# Patient Record
Sex: FEMALE | Race: WHITE | NOT HISPANIC OR LATINO
[De-identification: names, ages, dates, MRNs, and addresses within clinical notes are randomized per-mention and may not be internally consistent; named-entity substitution may affect disease eponyms.]

---

## 2017-03-13 ENCOUNTER — APPOINTMENT (OUTPATIENT)
Dept: INTERNAL MEDICINE | Facility: CLINIC | Age: 72
End: 2017-03-13

## 2017-03-13 VITALS
HEIGHT: 64.5 IN | BODY MASS INDEX: 20.74 KG/M2 | TEMPERATURE: 97.8 F | RESPIRATION RATE: 14 BRPM | WEIGHT: 123 LBS | SYSTOLIC BLOOD PRESSURE: 105 MMHG | OXYGEN SATURATION: 96 % | HEART RATE: 72 BPM | DIASTOLIC BLOOD PRESSURE: 70 MMHG

## 2017-03-14 LAB
25(OH)D3 SERPL-MCNC: 33.6 NG/ML
ALBUMIN SERPL ELPH-MCNC: 4.3 G/DL
ALP BLD-CCNC: 55 U/L
ALT SERPL-CCNC: 15 U/L
ANION GAP SERPL CALC-SCNC: 16 MMOL/L
APPEARANCE: CLEAR
AST SERPL-CCNC: 21 U/L
BASOPHILS # BLD AUTO: 0.02 K/UL
BASOPHILS NFR BLD AUTO: 0.4 %
BILIRUB SERPL-MCNC: 0.4 MG/DL
BILIRUBIN URINE: NEGATIVE
BLOOD URINE: NEGATIVE
BUN SERPL-MCNC: 23 MG/DL
CALCIUM SERPL-MCNC: 10.2 MG/DL
CHLORIDE SERPL-SCNC: 102 MMOL/L
CHOLEST SERPL-MCNC: 225 MG/DL
CHOLEST/HDLC SERPL: 3.2 RATIO
CO2 SERPL-SCNC: 22 MMOL/L
COLOR: YELLOW
CREAT SERPL-MCNC: 0.89 MG/DL
EOSINOPHIL # BLD AUTO: 0.06 K/UL
EOSINOPHIL NFR BLD AUTO: 1.3 %
GLUCOSE QUALITATIVE U: NORMAL MG/DL
GLUCOSE SERPL-MCNC: 91 MG/DL
HBA1C MFR BLD HPLC: 5.7 %
HCT VFR BLD CALC: 38.6 %
HDLC SERPL-MCNC: 70 MG/DL
HGB BLD-MCNC: 13 G/DL
IMM GRANULOCYTES NFR BLD AUTO: 0.2 %
KETONES URINE: NEGATIVE
LDLC SERPL CALC-MCNC: 137 MG/DL
LEUKOCYTE ESTERASE URINE: NEGATIVE
LYMPHOCYTES # BLD AUTO: 1.5 K/UL
LYMPHOCYTES NFR BLD AUTO: 31.6 %
MAN DIFF?: NORMAL
MCHC RBC-ENTMCNC: 33.7 GM/DL
MCHC RBC-ENTMCNC: 34.8 PG
MCV RBC AUTO: 103.2 FL
MONOCYTES # BLD AUTO: 0.48 K/UL
MONOCYTES NFR BLD AUTO: 10.1 %
NEUTROPHILS # BLD AUTO: 2.68 K/UL
NEUTROPHILS NFR BLD AUTO: 56.4 %
NITRITE URINE: NEGATIVE
PH URINE: 6
PLATELET # BLD AUTO: 217 K/UL
POTASSIUM SERPL-SCNC: 5.3 MMOL/L
PROT SERPL-MCNC: 7.3 G/DL
PROTEIN URINE: NEGATIVE MG/DL
RBC # BLD: 3.74 M/UL
RBC # FLD: 14.3 %
SODIUM SERPL-SCNC: 140 MMOL/L
SPECIFIC GRAVITY URINE: 1.01
TRIGL SERPL-MCNC: 88 MG/DL
UROBILINOGEN URINE: NORMAL MG/DL
WBC # FLD AUTO: 4.75 K/UL

## 2017-09-13 ENCOUNTER — APPOINTMENT (OUTPATIENT)
Dept: INTERNAL MEDICINE | Facility: CLINIC | Age: 72
End: 2017-09-13
Payer: MEDICARE

## 2017-09-13 VITALS
DIASTOLIC BLOOD PRESSURE: 70 MMHG | HEIGHT: 64.5 IN | TEMPERATURE: 98.7 F | OXYGEN SATURATION: 98 % | BODY MASS INDEX: 21.08 KG/M2 | SYSTOLIC BLOOD PRESSURE: 104 MMHG | RESPIRATION RATE: 12 BRPM | HEART RATE: 73 BPM | WEIGHT: 125 LBS

## 2017-09-13 DIAGNOSIS — R53.81 OTHER MALAISE: ICD-10-CM

## 2017-09-13 DIAGNOSIS — R53.83 OTHER MALAISE: ICD-10-CM

## 2017-09-13 PROCEDURE — 99214 OFFICE O/P EST MOD 30 MIN: CPT

## 2017-10-04 ENCOUNTER — MESSAGE (OUTPATIENT)
Age: 72
End: 2017-10-04

## 2017-10-26 ENCOUNTER — RX RENEWAL (OUTPATIENT)
Age: 72
End: 2017-10-26

## 2018-01-10 ENCOUNTER — RX RENEWAL (OUTPATIENT)
Age: 73
End: 2018-01-10

## 2018-03-05 ENCOUNTER — APPOINTMENT (OUTPATIENT)
Dept: INTERNAL MEDICINE | Facility: CLINIC | Age: 73
End: 2018-03-05
Payer: MEDICARE

## 2018-03-05 ENCOUNTER — LABORATORY RESULT (OUTPATIENT)
Age: 73
End: 2018-03-05

## 2018-03-05 VITALS
HEIGHT: 64 IN | BODY MASS INDEX: 21.34 KG/M2 | WEIGHT: 125 LBS | OXYGEN SATURATION: 95 % | SYSTOLIC BLOOD PRESSURE: 100 MMHG | DIASTOLIC BLOOD PRESSURE: 70 MMHG | HEART RATE: 64 BPM

## 2018-03-05 PROCEDURE — 36415 COLL VENOUS BLD VENIPUNCTURE: CPT

## 2018-03-05 PROCEDURE — 93000 ELECTROCARDIOGRAM COMPLETE: CPT

## 2018-03-05 PROCEDURE — G0439: CPT

## 2018-03-06 ENCOUNTER — MESSAGE (OUTPATIENT)
Age: 73
End: 2018-03-06

## 2018-03-06 LAB
ALBUMIN SERPL ELPH-MCNC: 4.5 G/DL
ALP BLD-CCNC: 57 U/L
ALT SERPL-CCNC: 14 U/L
ANION GAP SERPL CALC-SCNC: 15 MMOL/L
APPEARANCE: CLEAR
AST SERPL-CCNC: 23 U/L
BILIRUB SERPL-MCNC: 0.6 MG/DL
BILIRUBIN URINE: NEGATIVE
BLOOD URINE: NEGATIVE
BUN SERPL-MCNC: 13 MG/DL
CALCIUM SERPL-MCNC: 9.7 MG/DL
CHLORIDE SERPL-SCNC: 100 MMOL/L
CHOLEST SERPL-MCNC: 265 MG/DL
CHOLEST/HDLC SERPL: 3.2 RATIO
CO2 SERPL-SCNC: 27 MMOL/L
COLOR: YELLOW
CREAT SERPL-MCNC: 0.79 MG/DL
GLUCOSE QUALITATIVE U: NEGATIVE MG/DL
GLUCOSE SERPL-MCNC: 88 MG/DL
HBA1C MFR BLD HPLC: 5.6 %
HDLC SERPL-MCNC: 84 MG/DL
KETONES URINE: NEGATIVE
LDLC SERPL CALC-MCNC: 160 MG/DL
LEUKOCYTE ESTERASE URINE: NEGATIVE
NITRITE URINE: NEGATIVE
PH URINE: 7.5
POTASSIUM SERPL-SCNC: 4.6 MMOL/L
PROT SERPL-MCNC: 7.3 G/DL
PROTEIN URINE: NEGATIVE MG/DL
SODIUM SERPL-SCNC: 142 MMOL/L
SPECIFIC GRAVITY URINE: 1.01
TRIGL SERPL-MCNC: 105 MG/DL
TSH SERPL-ACNC: 0.86 UIU/ML
UROBILINOGEN URINE: NEGATIVE MG/DL
VIT B12 SERPL-MCNC: 959 PG/ML

## 2018-03-07 LAB
BASOPHILS # BLD AUTO: 0.01 K/UL
BASOPHILS NFR BLD AUTO: 0.3 %
EOSINOPHIL # BLD AUTO: 0.07 K/UL
EOSINOPHIL NFR BLD AUTO: 2.2 %
HCT VFR BLD CALC: 39.2 %
HGB BLD-MCNC: 12.8 G/DL
IMM GRANULOCYTES NFR BLD AUTO: 0 %
LYMPHOCYTES # BLD AUTO: 1.1 K/UL
LYMPHOCYTES NFR BLD AUTO: 34.6 %
MAN DIFF?: NORMAL
MCHC RBC-ENTMCNC: 32.7 GM/DL
MCHC RBC-ENTMCNC: 34.7 PG
MCV RBC AUTO: 106.2 FL
MONOCYTES # BLD AUTO: 0.25 K/UL
MONOCYTES NFR BLD AUTO: 7.9 %
NEUTROPHILS # BLD AUTO: 1.75 K/UL
NEUTROPHILS NFR BLD AUTO: 55 %
PLATELET # BLD AUTO: 195 K/UL
RBC # BLD: 3.69 M/UL
RBC # FLD: 15.4 %
WBC # FLD AUTO: 3.18 K/UL

## 2018-03-11 ENCOUNTER — RX RENEWAL (OUTPATIENT)
Age: 73
End: 2018-03-11

## 2018-04-29 ENCOUNTER — EMERGENCY (EMERGENCY)
Facility: HOSPITAL | Age: 73
LOS: 1 days | Discharge: HOME | End: 2018-04-29
Attending: EMERGENCY MEDICINE | Admitting: EMERGENCY MEDICINE
Payer: MEDICARE

## 2018-04-29 VITALS
TEMPERATURE: 97 F | SYSTOLIC BLOOD PRESSURE: 102 MMHG | RESPIRATION RATE: 19 BRPM | DIASTOLIC BLOOD PRESSURE: 68 MMHG | OXYGEN SATURATION: 97 % | HEART RATE: 58 BPM | HEIGHT: 65 IN | WEIGHT: 128.97 LBS

## 2018-04-29 DIAGNOSIS — K86.2 CYST OF PANCREAS: ICD-10-CM

## 2018-04-29 DIAGNOSIS — R10.13 EPIGASTRIC PAIN: ICD-10-CM

## 2018-04-29 DIAGNOSIS — Z98.891 HISTORY OF UTERINE SCAR FROM PREVIOUS SURGERY: Chronic | ICD-10-CM

## 2018-04-29 LAB
ALBUMIN SERPL ELPH-MCNC: 3.6 G/DL — SIGNIFICANT CHANGE UP (ref 3.4–5)
ALP SERPL-CCNC: 70 U/L — SIGNIFICANT CHANGE UP (ref 40–120)
ALT FLD-CCNC: 22 U/L — SIGNIFICANT CHANGE UP (ref 12–42)
ANION GAP SERPL CALC-SCNC: 8 MMOL/L — LOW (ref 9–16)
APPEARANCE UR: CLEAR — SIGNIFICANT CHANGE UP
AST SERPL-CCNC: 24 U/L — SIGNIFICANT CHANGE UP (ref 15–37)
BASOPHILS NFR BLD AUTO: 0.8 % — SIGNIFICANT CHANGE UP (ref 0–2)
BILIRUB SERPL-MCNC: 0.4 MG/DL — SIGNIFICANT CHANGE UP (ref 0.2–1.2)
BILIRUB UR-MCNC: NEGATIVE — SIGNIFICANT CHANGE UP
BUN SERPL-MCNC: 23 MG/DL — SIGNIFICANT CHANGE UP (ref 7–23)
CALCIUM SERPL-MCNC: 9.2 MG/DL — SIGNIFICANT CHANGE UP (ref 8.5–10.5)
CHLORIDE SERPL-SCNC: 104 MMOL/L — SIGNIFICANT CHANGE UP (ref 96–108)
CO2 SERPL-SCNC: 29 MMOL/L — SIGNIFICANT CHANGE UP (ref 22–31)
COLOR SPEC: YELLOW — SIGNIFICANT CHANGE UP
CREAT SERPL-MCNC: 0.82 MG/DL — SIGNIFICANT CHANGE UP (ref 0.5–1.3)
DIFF PNL FLD: NEGATIVE — SIGNIFICANT CHANGE UP
EOSINOPHIL NFR BLD AUTO: 4.9 % — SIGNIFICANT CHANGE UP (ref 0–6)
GLUCOSE SERPL-MCNC: 104 MG/DL — HIGH (ref 70–99)
GLUCOSE UR QL: NEGATIVE — SIGNIFICANT CHANGE UP
HCT VFR BLD CALC: 35.3 % — SIGNIFICANT CHANGE UP (ref 34.5–45)
HGB BLD-MCNC: 12.2 G/DL — SIGNIFICANT CHANGE UP (ref 11.5–15.5)
IMM GRANULOCYTES NFR BLD AUTO: 0.2 % — SIGNIFICANT CHANGE UP (ref 0–1.5)
KETONES UR-MCNC: NEGATIVE — SIGNIFICANT CHANGE UP
LEUKOCYTE ESTERASE UR-ACNC: NEGATIVE — SIGNIFICANT CHANGE UP
LIDOCAIN IGE QN: 147 U/L — SIGNIFICANT CHANGE UP (ref 73–393)
LYMPHOCYTES # BLD AUTO: 32.7 % — SIGNIFICANT CHANGE UP (ref 13–44)
MCHC RBC-ENTMCNC: 34.6 G/DL — SIGNIFICANT CHANGE UP (ref 32–36)
MCHC RBC-ENTMCNC: 34.7 PG — HIGH (ref 27–34)
MCV RBC AUTO: 100.3 FL — HIGH (ref 80–100)
MONOCYTES NFR BLD AUTO: 10.3 % — SIGNIFICANT CHANGE UP (ref 2–14)
NEUTROPHILS NFR BLD AUTO: 51.1 % — SIGNIFICANT CHANGE UP (ref 43–77)
NITRITE UR-MCNC: NEGATIVE — SIGNIFICANT CHANGE UP
PH UR: 7.5 — SIGNIFICANT CHANGE UP (ref 5–8)
PLATELET # BLD AUTO: 192 K/UL — SIGNIFICANT CHANGE UP (ref 150–400)
POTASSIUM SERPL-MCNC: 4.1 MMOL/L — SIGNIFICANT CHANGE UP (ref 3.5–5.3)
POTASSIUM SERPL-SCNC: 4.1 MMOL/L — SIGNIFICANT CHANGE UP (ref 3.5–5.3)
PROT SERPL-MCNC: 7.6 G/DL — SIGNIFICANT CHANGE UP (ref 6.4–8.2)
PROT UR-MCNC: NEGATIVE MG/DL — SIGNIFICANT CHANGE UP
RBC # BLD: 3.52 M/UL — LOW (ref 3.8–5.2)
RBC # FLD: 13.2 % — SIGNIFICANT CHANGE UP (ref 10.3–16.9)
SODIUM SERPL-SCNC: 141 MMOL/L — SIGNIFICANT CHANGE UP (ref 132–145)
SP GR SPEC: 1.01 — SIGNIFICANT CHANGE UP (ref 1–1.03)
UROBILINOGEN FLD QL: 0.2 E.U./DL — SIGNIFICANT CHANGE UP
WBC # BLD: 4.7 K/UL — SIGNIFICANT CHANGE UP (ref 3.8–10.5)
WBC # FLD AUTO: 4.7 K/UL — SIGNIFICANT CHANGE UP (ref 3.8–10.5)

## 2018-04-29 PROCEDURE — 74019 RADEX ABDOMEN 2 VIEWS: CPT | Mod: 26

## 2018-04-29 PROCEDURE — 74177 CT ABD & PELVIS W/CONTRAST: CPT | Mod: 26

## 2018-04-29 PROCEDURE — 99284 EMERGENCY DEPT VISIT MOD MDM: CPT

## 2018-04-29 RX ORDER — IOHEXOL 300 MG/ML
50 INJECTION, SOLUTION INTRAVENOUS ONCE
Qty: 0 | Refills: 0 | Status: COMPLETED | OUTPATIENT
Start: 2018-04-29 | End: 2018-04-29

## 2018-04-29 RX ORDER — SODIUM CHLORIDE 9 MG/ML
1000 INJECTION INTRAMUSCULAR; INTRAVENOUS; SUBCUTANEOUS
Qty: 0 | Refills: 0 | Status: DISCONTINUED | OUTPATIENT
Start: 2018-04-29 | End: 2018-05-03

## 2018-04-29 RX ADMIN — SODIUM CHLORIDE 75 MILLILITER(S): 9 INJECTION INTRAMUSCULAR; INTRAVENOUS; SUBCUTANEOUS at 20:50

## 2018-04-29 RX ADMIN — IOHEXOL 50 MILLILITER(S): 300 INJECTION, SOLUTION INTRAVENOUS at 20:50

## 2018-04-29 NOTE — ED ADULT NURSE NOTE - CHIEF COMPLAINT QUOTE
aox3 verbal, ambulatory, uncomfortably appearing c/o sudden onset abd pain, lower pelvic pain since this afternoon, denies any n/v/d +cramping. decreased po intake.

## 2018-04-29 NOTE — ED ADULT TRIAGE NOTE - CHIEF COMPLAINT QUOTE
aox3 verbal, ambulatory, uncomfortably appearing c/o sudden onset abd pain, +nausea and decreased po intake. aox3 verbal, ambulatory, uncomfortably appearing c/o sudden onset abd pain, lower pelvic pain since this afternoon, denies any n/v/d +cramping. decreased po intake.

## 2018-04-29 NOTE — ED PROVIDER NOTE - OBJECTIVE STATEMENT
73 yof pw lower abd pain since around 2pm.  initially epigastric, then to lower abd.  prior hx of C section.  no urinary sx.  no nvd.  last bm yesterday, +flatus today.  ordered fish from restaurant but has had it before.  no other complaints.  cramping and bloated.

## 2018-04-30 VITALS
TEMPERATURE: 98 F | OXYGEN SATURATION: 96 % | SYSTOLIC BLOOD PRESSURE: 109 MMHG | HEART RATE: 57 BPM | RESPIRATION RATE: 18 BRPM | DIASTOLIC BLOOD PRESSURE: 71 MMHG

## 2018-04-30 RX ORDER — POLYETHYLENE GLYCOL 3350 17 G/17G
1 POWDER, FOR SOLUTION ORAL
Qty: 100 | Refills: 0 | OUTPATIENT
Start: 2018-04-30 | End: 2018-05-01

## 2018-04-30 RX ORDER — DOCUSATE SODIUM 100 MG
1 CAPSULE ORAL
Qty: 28 | Refills: 0 | OUTPATIENT
Start: 2018-04-30 | End: 2018-05-13

## 2018-05-04 ENCOUNTER — RX RENEWAL (OUTPATIENT)
Age: 73
End: 2018-05-04

## 2018-06-22 ENCOUNTER — MESSAGE (OUTPATIENT)
Age: 73
End: 2018-06-22

## 2018-08-01 ENCOUNTER — LABORATORY RESULT (OUTPATIENT)
Age: 73
End: 2018-08-01

## 2018-08-01 ENCOUNTER — APPOINTMENT (OUTPATIENT)
Dept: INTERNAL MEDICINE | Facility: CLINIC | Age: 73
End: 2018-08-01
Payer: MEDICARE

## 2018-08-01 VITALS
HEIGHT: 64 IN | BODY MASS INDEX: 21.34 KG/M2 | DIASTOLIC BLOOD PRESSURE: 70 MMHG | OXYGEN SATURATION: 97 % | HEART RATE: 70 BPM | TEMPERATURE: 97.7 F | SYSTOLIC BLOOD PRESSURE: 110 MMHG | WEIGHT: 125 LBS

## 2018-08-01 PROCEDURE — 99214 OFFICE O/P EST MOD 30 MIN: CPT | Mod: 25

## 2018-08-01 PROCEDURE — 36415 COLL VENOUS BLD VENIPUNCTURE: CPT

## 2018-08-01 NOTE — PHYSICAL EXAM
[No Acute Distress] : no acute distress [Well Nourished] : well nourished [Well Developed] : well developed [Well-Appearing] : well-appearing [No Joint Swelling] : no joint swelling [Grossly Normal Strength/Tone] : grossly normal strength/tone [No Rash] : no rash [Normal Gait] : normal gait [No Focal Deficits] : no focal deficits [Normal Insight/Judgement] : insight and judgment were intact

## 2018-08-01 NOTE — HISTORY OF PRESENT ILLNESS
[FreeTextEntry1] : hip pain [de-identified] : Back pain has improved was a compression fracture was getting PT\par takes prn nsaid - \par Left hip locks at times.  \par Cholesterol was high -has been working on lifestyle,  Watching her diet, and very active.  \par Doing well with her fluoxetine.\par \par In April went to urgent care - for constipation -

## 2018-08-01 NOTE — ASSESSMENT
[FreeTextEntry1] : Anxiety- cont fluoxetine\par Hip pain- cont PT and send for MRI, ortho referral\par HLD_ check fasting lipids today, consider statin pending results\par f/up for cpe

## 2018-08-02 LAB
BASOPHILS # BLD AUTO: 0.02 K/UL
BASOPHILS NFR BLD AUTO: 0.9 %
CHOLEST SERPL-MCNC: 225 MG/DL
CHOLEST/HDLC SERPL: 3 RATIO
EOSINOPHIL # BLD AUTO: 0.13 K/UL
EOSINOPHIL NFR BLD AUTO: 5.2 %
HCT VFR BLD CALC: 37.5 %
HDLC SERPL-MCNC: 74 MG/DL
HGB BLD-MCNC: 12.5 G/DL
LDLC SERPL CALC-MCNC: 137 MG/DL
LYMPHOCYTES # BLD AUTO: 0.73 K/UL
LYMPHOCYTES NFR BLD AUTO: 29.3 %
MAN DIFF?: NORMAL
MCHC RBC-ENTMCNC: 33.3 GM/DL
MCHC RBC-ENTMCNC: 34.8 PG
MCV RBC AUTO: 104.5 FL
MONOCYTES # BLD AUTO: 0.26 K/UL
MONOCYTES NFR BLD AUTO: 10.3 %
NEUTROPHILS # BLD AUTO: 1.36 K/UL
NEUTROPHILS NFR BLD AUTO: 54.3 %
PLATELET # BLD AUTO: 219 K/UL
RBC # BLD: 3.59 M/UL
RBC # FLD: 14.7 %
TRIGL SERPL-MCNC: 70 MG/DL
WBC # FLD AUTO: 2.5 K/UL

## 2018-08-30 ENCOUNTER — RX RENEWAL (OUTPATIENT)
Age: 73
End: 2018-08-30

## 2018-09-19 ENCOUNTER — FORM ENCOUNTER (OUTPATIENT)
Age: 73
End: 2018-09-19

## 2018-09-20 ENCOUNTER — OUTPATIENT (OUTPATIENT)
Dept: OUTPATIENT SERVICES | Facility: HOSPITAL | Age: 73
LOS: 1 days | End: 2018-09-20
Payer: MEDICARE

## 2018-09-20 ENCOUNTER — APPOINTMENT (OUTPATIENT)
Dept: ORTHOPEDIC SURGERY | Facility: CLINIC | Age: 73
End: 2018-09-20
Payer: MEDICARE

## 2018-09-20 VITALS
OXYGEN SATURATION: 98 % | WEIGHT: 123 LBS | HEART RATE: 64 BPM | SYSTOLIC BLOOD PRESSURE: 110 MMHG | DIASTOLIC BLOOD PRESSURE: 70 MMHG | BODY MASS INDEX: 21 KG/M2 | HEIGHT: 64 IN

## 2018-09-20 DIAGNOSIS — Z98.891 HISTORY OF UTERINE SCAR FROM PREVIOUS SURGERY: Chronic | ICD-10-CM

## 2018-09-20 PROCEDURE — 72020 X-RAY EXAM OF SPINE 1 VIEW: CPT

## 2018-09-20 PROCEDURE — 73502 X-RAY EXAM HIP UNI 2-3 VIEWS: CPT | Mod: 26,LT

## 2018-09-20 PROCEDURE — 72020 X-RAY EXAM OF SPINE 1 VIEW: CPT | Mod: 26

## 2018-09-20 PROCEDURE — 73502 X-RAY EXAM HIP UNI 2-3 VIEWS: CPT

## 2018-09-20 PROCEDURE — 99204 OFFICE O/P NEW MOD 45 MIN: CPT

## 2018-11-28 ENCOUNTER — FORM ENCOUNTER (OUTPATIENT)
Age: 73
End: 2018-11-28

## 2018-11-29 ENCOUNTER — APPOINTMENT (OUTPATIENT)
Dept: ORTHOPEDIC SURGERY | Facility: CLINIC | Age: 73
End: 2018-11-29
Payer: MEDICARE

## 2018-11-29 ENCOUNTER — OUTPATIENT (OUTPATIENT)
Dept: OUTPATIENT SERVICES | Facility: HOSPITAL | Age: 73
LOS: 1 days | End: 2018-11-29
Payer: MEDICARE

## 2018-11-29 DIAGNOSIS — Z98.891 HISTORY OF UTERINE SCAR FROM PREVIOUS SURGERY: Chronic | ICD-10-CM

## 2018-11-29 DIAGNOSIS — Z01.818 ENCOUNTER FOR OTHER PREPROCEDURAL EXAMINATION: ICD-10-CM

## 2018-11-29 LAB
ANION GAP SERPL CALC-SCNC: 8 MMOL/L — SIGNIFICANT CHANGE UP (ref 5–17)
APPEARANCE UR: CLEAR — SIGNIFICANT CHANGE UP
APTT BLD: 32.7 SEC — SIGNIFICANT CHANGE UP (ref 27.5–36.3)
BILIRUB UR-MCNC: NEGATIVE — SIGNIFICANT CHANGE UP
BUN SERPL-MCNC: 15 MG/DL — SIGNIFICANT CHANGE UP (ref 7–23)
CALCIUM SERPL-MCNC: 9.4 MG/DL — SIGNIFICANT CHANGE UP (ref 8.4–10.5)
CHLORIDE SERPL-SCNC: 102 MMOL/L — SIGNIFICANT CHANGE UP (ref 96–108)
CHOLEST SERPL-MCNC: 223 MG/DL — HIGH (ref 10–199)
CO2 SERPL-SCNC: 29 MMOL/L — SIGNIFICANT CHANGE UP (ref 22–31)
COLOR SPEC: YELLOW — SIGNIFICANT CHANGE UP
CREAT SERPL-MCNC: 0.76 MG/DL — SIGNIFICANT CHANGE UP (ref 0.5–1.3)
DIFF PNL FLD: NEGATIVE — SIGNIFICANT CHANGE UP
GLUCOSE SERPL-MCNC: 97 MG/DL — SIGNIFICANT CHANGE UP (ref 70–99)
GLUCOSE UR QL: NEGATIVE — SIGNIFICANT CHANGE UP
HBA1C BLD-MCNC: 5.3 % — SIGNIFICANT CHANGE UP (ref 4–5.6)
HCT VFR BLD CALC: 36.5 % — SIGNIFICANT CHANGE UP (ref 34.5–45)
HDLC SERPL-MCNC: 75 MG/DL — SIGNIFICANT CHANGE UP
HGB BLD-MCNC: 12.2 G/DL — SIGNIFICANT CHANGE UP (ref 11.5–15.5)
INR BLD: 1.01 — SIGNIFICANT CHANGE UP (ref 0.88–1.16)
KETONES UR-MCNC: NEGATIVE — SIGNIFICANT CHANGE UP
LEUKOCYTE ESTERASE UR-ACNC: NEGATIVE — SIGNIFICANT CHANGE UP
LIPID PNL WITH DIRECT LDL SERPL: 131 MG/DL — HIGH
MCHC RBC-ENTMCNC: 33.4 G/DL — SIGNIFICANT CHANGE UP (ref 32–36)
MCHC RBC-ENTMCNC: 33.8 PG — SIGNIFICANT CHANGE UP (ref 27–34)
MCV RBC AUTO: 101.1 FL — HIGH (ref 80–100)
NITRITE UR-MCNC: NEGATIVE — SIGNIFICANT CHANGE UP
PH UR: 7.5 — SIGNIFICANT CHANGE UP (ref 5–8)
PLATELET # BLD AUTO: 235 K/UL — SIGNIFICANT CHANGE UP (ref 150–400)
POTASSIUM SERPL-MCNC: 4.5 MMOL/L — SIGNIFICANT CHANGE UP (ref 3.5–5.3)
POTASSIUM SERPL-SCNC: 4.5 MMOL/L — SIGNIFICANT CHANGE UP (ref 3.5–5.3)
PROT UR-MCNC: NEGATIVE MG/DL — SIGNIFICANT CHANGE UP
PROTHROM AB SERPL-ACNC: 11.4 SEC — SIGNIFICANT CHANGE UP (ref 10–12.9)
RBC # BLD: 3.61 M/UL — LOW (ref 3.8–5.2)
RBC # FLD: 13.1 % — SIGNIFICANT CHANGE UP (ref 10.3–16.9)
SODIUM SERPL-SCNC: 139 MMOL/L — SIGNIFICANT CHANGE UP (ref 135–145)
SP GR SPEC: 1.01 — SIGNIFICANT CHANGE UP (ref 1–1.03)
TOTAL CHOLESTEROL/HDL RATIO MEASUREMENT: 3 RATIO — LOW (ref 3.3–7.1)
TRIGL SERPL-MCNC: 84 MG/DL — SIGNIFICANT CHANGE UP (ref 10–149)
UROBILINOGEN FLD QL: 0.2 E.U./DL — SIGNIFICANT CHANGE UP
WBC # BLD: 3.5 K/UL — LOW (ref 3.8–10.5)
WBC # FLD AUTO: 3.5 K/UL — LOW (ref 3.8–10.5)

## 2018-11-29 PROCEDURE — 71046 X-RAY EXAM CHEST 2 VIEWS: CPT

## 2018-11-29 PROCEDURE — 87086 URINE CULTURE/COLONY COUNT: CPT

## 2018-11-29 PROCEDURE — 71046 X-RAY EXAM CHEST 2 VIEWS: CPT | Mod: 26

## 2018-11-29 PROCEDURE — 93005 ELECTROCARDIOGRAM TRACING: CPT

## 2018-11-29 PROCEDURE — 85610 PROTHROMBIN TIME: CPT

## 2018-11-29 PROCEDURE — 93010 ELECTROCARDIOGRAM REPORT: CPT

## 2018-11-29 PROCEDURE — 99213 OFFICE O/P EST LOW 20 MIN: CPT

## 2018-11-29 PROCEDURE — 81003 URINALYSIS AUTO W/O SCOPE: CPT

## 2018-11-29 PROCEDURE — 80048 BASIC METABOLIC PNL TOTAL CA: CPT

## 2018-11-29 PROCEDURE — 83036 HEMOGLOBIN GLYCOSYLATED A1C: CPT

## 2018-11-29 PROCEDURE — 80061 LIPID PANEL: CPT

## 2018-11-29 PROCEDURE — 85027 COMPLETE CBC AUTOMATED: CPT

## 2018-11-29 PROCEDURE — 85730 THROMBOPLASTIN TIME PARTIAL: CPT

## 2018-11-29 RX ORDER — CYCLOBENZAPRINE HYDROCHLORIDE 5 MG/1
5 TABLET, FILM COATED ORAL 3 TIMES DAILY
Qty: 30 | Refills: 5 | Status: DISCONTINUED | COMMUNITY
Start: 2017-09-18 | End: 2018-11-29

## 2018-11-30 LAB
CULTURE RESULTS: NO GROWTH — SIGNIFICANT CHANGE UP
SPECIMEN SOURCE: SIGNIFICANT CHANGE UP

## 2018-12-03 ENCOUNTER — OUTPATIENT (OUTPATIENT)
Dept: OUTPATIENT SERVICES | Facility: HOSPITAL | Age: 73
LOS: 1 days | End: 2018-12-03
Payer: COMMERCIAL

## 2018-12-03 DIAGNOSIS — Z22.321 CARRIER OR SUSPECTED CARRIER OF METHICILLIN SUSCEPTIBLE STAPHYLOCOCCUS AUREUS: ICD-10-CM

## 2018-12-03 DIAGNOSIS — Z98.891 HISTORY OF UTERINE SCAR FROM PREVIOUS SURGERY: Chronic | ICD-10-CM

## 2018-12-03 LAB
MRSA PCR RESULT.: NEGATIVE — SIGNIFICANT CHANGE UP
S AUREUS DNA NOSE QL NAA+PROBE: NEGATIVE — SIGNIFICANT CHANGE UP

## 2018-12-03 PROCEDURE — 87641 MR-STAPH DNA AMP PROBE: CPT

## 2018-12-04 LAB
ANABASINE UR-MCNC: <2 NG/ML — SIGNIFICANT CHANGE UP
COTININE UR-MCNC: <5 NG/ML — SIGNIFICANT CHANGE UP
NICOTINE UR-MCNC: <5 NG/ML — SIGNIFICANT CHANGE UP
NORNICOTINE UR-MCNC: <2 NG/ML — SIGNIFICANT CHANGE UP

## 2018-12-12 ENCOUNTER — APPOINTMENT (OUTPATIENT)
Dept: INTERNAL MEDICINE | Facility: CLINIC | Age: 73
End: 2018-12-12
Payer: MEDICARE

## 2018-12-12 VITALS
HEART RATE: 75 BPM | BODY MASS INDEX: 21.34 KG/M2 | WEIGHT: 125 LBS | OXYGEN SATURATION: 95 % | SYSTOLIC BLOOD PRESSURE: 110 MMHG | HEIGHT: 64 IN | TEMPERATURE: 97.3 F | DIASTOLIC BLOOD PRESSURE: 60 MMHG

## 2018-12-12 DIAGNOSIS — D64.9 ANEMIA, UNSPECIFIED: ICD-10-CM

## 2018-12-12 PROCEDURE — 99214 OFFICE O/P EST MOD 30 MIN: CPT

## 2018-12-12 NOTE — HISTORY OF PRESENT ILLNESS
[No Pertinent Cardiac History] : no history of aortic stenosis, atrial fibrillation, coronary artery disease, recent myocardial infarction, or implantable device/pacemaker [No Pertinent Pulmonary History] : no history of asthma, COPD, sleep apnea, or smoking [No Adverse Anesthesia Reaction] : no adverse anesthesia reaction in self or family member [FreeTextEntry1] : left THR [FreeTextEntry3] : Dr Andujar [FreeTextEntry4] : Severe left hip pain which is affecting her QOL.  \par

## 2018-12-12 NOTE — PHYSICAL EXAM
[No Acute Distress] : no acute distress [Well Nourished] : well nourished [Well Developed] : well developed [Well-Appearing] : well-appearing [Normal Sclera/Conjunctiva] : normal sclera/conjunctiva [PERRL] : pupils equal round and reactive to light [EOMI] : extraocular movements intact [Normal Outer Ear/Nose] : the outer ears and nose were normal in appearance [Normal Oropharynx] : the oropharynx was normal [No JVD] : no jugular venous distention [Supple] : supple [No Lymphadenopathy] : no lymphadenopathy [Thyroid Normal, No Nodules] : the thyroid was normal and there were no nodules present [No Respiratory Distress] : no respiratory distress  [Clear to Auscultation] : lungs were clear to auscultation bilaterally [No Accessory Muscle Use] : no accessory muscle use [Normal Rate] : normal rate  [Regular Rhythm] : with a regular rhythm [Normal S1, S2] : normal S1 and S2 [No Murmur] : no murmur heard [Pedal Pulses Present] : the pedal pulses are present [No Edema] : there was no peripheral edema [Normal Posterior Cervical Nodes] : no posterior cervical lymphadenopathy [Normal Anterior Cervical Nodes] : no anterior cervical lymphadenopathy [No CVA Tenderness] : no CVA  tenderness [No Spinal Tenderness] : no spinal tenderness [No Joint Swelling] : no joint swelling [Grossly Normal Strength/Tone] : grossly normal strength/tone [No Rash] : no rash [Normal Gait] : normal gait [Coordination Grossly Intact] : coordination grossly intact [No Focal Deficits] : no focal deficits [Deep Tendon Reflexes (DTR)] : deep tendon reflexes were 2+ and symmetric [Normal Affect] : the affect was normal [Normal Insight/Judgement] : insight and judgment were intact

## 2019-01-04 VITALS
OXYGEN SATURATION: 100 % | TEMPERATURE: 98 F | HEART RATE: 73 BPM | SYSTOLIC BLOOD PRESSURE: 111 MMHG | HEIGHT: 64 IN | DIASTOLIC BLOOD PRESSURE: 71 MMHG | RESPIRATION RATE: 16 BRPM | WEIGHT: 123.9 LBS

## 2019-01-04 NOTE — H&P ADULT - PROBLEM SELECTOR PLAN 1
Admit to Orthopaedic Service.  Presents today for elective left THR  Pt medically stable and cleared for procedure today by Dr. Eastman

## 2019-01-04 NOTE — H&P ADULT - NSHPLABSRESULTS_GEN_ALL_CORE
Preop CBC, BMP, PT/PTT/INR, UA - WNL per medical clearance  Preop EKG - NSR -  WNL per medical clearance  Preop CXR - WNL per medical clearance  Nasal swab negative

## 2019-01-04 NOTE — PATIENT PROFILE ADULT - NSPROGENPREVTRANSF_GEN_A_NUR
Okay to refer patient to see pulmonary specialist Dr. Langston or Dr. Nowak  Regarding epistaxis issue, continue saline nasal spray and arrange for consultation with Dr. Peng ENT specialist  Current anxiety might be related to her difficulty breathing. Once her breathing improved after pulmonary consult may get better. I will like to avoid more medication with her current situation  If anxiety symptoms continues, patient may need counseling   no

## 2019-01-04 NOTE — H&P ADULT - HISTORY OF PRESENT ILLNESS
73F c/o left hip pain x     Pt has failed conservative treatment for her symptoms  Present for elective left total hip replacement today 73F c/o left hip pain x 5-6 years, worsening over the last 9 months. The patient states that she injured her left hip while playing tennis x 6 years ago and has a history of worsening osteoarthritis in the left hip. She states her pain is localized to her right hip and complains of only occasional numbness/tingling down the left lower extremity. She states that the pain is worsened after walking half a block and that she sometimes uses a wheelchair or an assistive person to ambulate. Denies use of cane or walker. The patient states she failed conservative therapies including physical therapy for her symptoms. The patient states she occasionally takes Tylenol for her pain. No hx of blood clot.     Present for elective left total hip replacement today.

## 2019-01-07 ENCOUNTER — APPOINTMENT (OUTPATIENT)
Dept: ORTHOPEDIC SURGERY | Facility: HOSPITAL | Age: 74
End: 2019-01-07
Payer: MEDICARE

## 2019-01-07 ENCOUNTER — RESULT REVIEW (OUTPATIENT)
Age: 74
End: 2019-01-07

## 2019-01-07 ENCOUNTER — TRANSCRIPTION ENCOUNTER (OUTPATIENT)
Age: 74
End: 2019-01-07

## 2019-01-07 ENCOUNTER — MEDICATION RENEWAL (OUTPATIENT)
Age: 74
End: 2019-01-07

## 2019-01-07 ENCOUNTER — INPATIENT (INPATIENT)
Facility: HOSPITAL | Age: 74
LOS: 0 days | Discharge: HOME HEALTH | DRG: 470 | End: 2019-01-08
Attending: ORTHOPAEDIC SURGERY | Admitting: ORTHOPAEDIC SURGERY
Payer: MEDICARE

## 2019-01-07 DIAGNOSIS — Z90.89 ACQUIRED ABSENCE OF OTHER ORGANS: Chronic | ICD-10-CM

## 2019-01-07 DIAGNOSIS — Z98.891 HISTORY OF UTERINE SCAR FROM PREVIOUS SURGERY: Chronic | ICD-10-CM

## 2019-01-07 DIAGNOSIS — M16.12 UNILATERAL PRIMARY OSTEOARTHRITIS, LEFT HIP: ICD-10-CM

## 2019-01-07 DIAGNOSIS — F41.9 ANXIETY DISORDER, UNSPECIFIED: ICD-10-CM

## 2019-01-07 DIAGNOSIS — Z98.890 OTHER SPECIFIED POSTPROCEDURAL STATES: Chronic | ICD-10-CM

## 2019-01-07 DIAGNOSIS — E78.5 HYPERLIPIDEMIA, UNSPECIFIED: ICD-10-CM

## 2019-01-07 LAB
HCT VFR BLD CALC: 32.4 % — LOW (ref 34.5–45)
HGB BLD-MCNC: 11.2 G/DL — LOW (ref 11.5–15.5)
MCHC RBC-ENTMCNC: 33.7 PG — SIGNIFICANT CHANGE UP (ref 27–34)
MCHC RBC-ENTMCNC: 34.6 G/DL — SIGNIFICANT CHANGE UP (ref 32–36)
MCV RBC AUTO: 97.6 FL — SIGNIFICANT CHANGE UP (ref 80–100)
PLATELET # BLD AUTO: 168 K/UL — SIGNIFICANT CHANGE UP (ref 150–400)
RBC # BLD: 3.32 M/UL — LOW (ref 3.8–5.2)
RBC # FLD: 13.7 % — SIGNIFICANT CHANGE UP (ref 10.3–16.9)
WBC # BLD: 4.8 K/UL — SIGNIFICANT CHANGE UP (ref 3.8–10.5)
WBC # FLD AUTO: 4.8 K/UL — SIGNIFICANT CHANGE UP (ref 3.8–10.5)

## 2019-01-07 PROCEDURE — 27130 TOTAL HIP ARTHROPLASTY: CPT | Mod: AS,LT

## 2019-01-07 PROCEDURE — 27130 TOTAL HIP ARTHROPLASTY: CPT | Mod: LT

## 2019-01-07 PROCEDURE — 72170 X-RAY EXAM OF PELVIS: CPT | Mod: 26

## 2019-01-07 RX ORDER — SODIUM CHLORIDE 9 MG/ML
1000 INJECTION, SOLUTION INTRAVENOUS
Qty: 0 | Refills: 0 | Status: DISCONTINUED | OUTPATIENT
Start: 2019-01-07 | End: 2019-01-08

## 2019-01-07 RX ORDER — MAGNESIUM HYDROXIDE 400 MG/1
30 TABLET, CHEWABLE ORAL DAILY
Qty: 0 | Refills: 0 | Status: DISCONTINUED | OUTPATIENT
Start: 2019-01-07 | End: 2019-01-08

## 2019-01-07 RX ORDER — ONDANSETRON 8 MG/1
4 TABLET, FILM COATED ORAL EVERY 6 HOURS
Qty: 0 | Refills: 0 | Status: DISCONTINUED | OUTPATIENT
Start: 2019-01-07 | End: 2019-01-08

## 2019-01-07 RX ORDER — KETOROLAC TROMETHAMINE 30 MG/ML
15 SYRINGE (ML) INJECTION EVERY 6 HOURS
Qty: 0 | Refills: 0 | Status: DISCONTINUED | OUTPATIENT
Start: 2019-01-07 | End: 2019-01-08

## 2019-01-07 RX ORDER — BUPIVACAINE 13.3 MG/ML
20 INJECTION, SUSPENSION, LIPOSOMAL INFILTRATION ONCE
Qty: 0 | Refills: 0 | Status: DISCONTINUED | OUTPATIENT
Start: 2019-01-07 | End: 2019-01-08

## 2019-01-07 RX ORDER — MORPHINE SULFATE 50 MG/1
2 CAPSULE, EXTENDED RELEASE ORAL EVERY 4 HOURS
Qty: 0 | Refills: 0 | Status: DISCONTINUED | OUTPATIENT
Start: 2019-01-07 | End: 2019-01-08

## 2019-01-07 RX ORDER — CELECOXIB 200 MG/1
400 CAPSULE ORAL ONCE
Qty: 0 | Refills: 0 | Status: COMPLETED | OUTPATIENT
Start: 2019-01-07 | End: 2019-01-07

## 2019-01-07 RX ORDER — ACETAMINOPHEN 500 MG
650 TABLET ORAL EVERY 6 HOURS
Qty: 0 | Refills: 0 | Status: DISCONTINUED | OUTPATIENT
Start: 2019-01-07 | End: 2019-01-08

## 2019-01-07 RX ORDER — CEFAZOLIN SODIUM 1 G
2000 VIAL (EA) INJECTION EVERY 8 HOURS
Qty: 0 | Refills: 0 | Status: COMPLETED | OUTPATIENT
Start: 2019-01-07 | End: 2019-01-08

## 2019-01-07 RX ORDER — DOCUSATE SODIUM 100 MG
100 CAPSULE ORAL THREE TIMES A DAY
Qty: 0 | Refills: 0 | Status: DISCONTINUED | OUTPATIENT
Start: 2019-01-07 | End: 2019-01-08

## 2019-01-07 RX ORDER — OXYCODONE HYDROCHLORIDE 5 MG/1
10 TABLET ORAL EVERY 4 HOURS
Qty: 0 | Refills: 0 | Status: DISCONTINUED | OUTPATIENT
Start: 2019-01-07 | End: 2019-01-08

## 2019-01-07 RX ORDER — SENNA PLUS 8.6 MG/1
2 TABLET ORAL AT BEDTIME
Qty: 0 | Refills: 0 | Status: DISCONTINUED | OUTPATIENT
Start: 2019-01-07 | End: 2019-01-08

## 2019-01-07 RX ORDER — POLYETHYLENE GLYCOL 3350 17 G/17G
17 POWDER, FOR SOLUTION ORAL DAILY
Qty: 0 | Refills: 0 | Status: DISCONTINUED | OUTPATIENT
Start: 2019-01-07 | End: 2019-01-08

## 2019-01-07 RX ORDER — OXYCODONE HYDROCHLORIDE 5 MG/1
5 TABLET ORAL EVERY 4 HOURS
Qty: 0 | Refills: 0 | Status: DISCONTINUED | OUTPATIENT
Start: 2019-01-07 | End: 2019-01-08

## 2019-01-07 RX ORDER — CELECOXIB 200 MG/1
200 CAPSULE ORAL
Qty: 0 | Refills: 0 | Status: DISCONTINUED | OUTPATIENT
Start: 2019-01-07 | End: 2019-01-08

## 2019-01-07 RX ORDER — CEFAZOLIN SODIUM 1 G
2000 VIAL (EA) INJECTION EVERY 8 HOURS
Qty: 0 | Refills: 0 | Status: DISCONTINUED | OUTPATIENT
Start: 2019-01-07 | End: 2019-01-07

## 2019-01-07 RX ORDER — ACETAMINOPHEN 500 MG
1000 TABLET ORAL ONCE
Qty: 0 | Refills: 0 | Status: COMPLETED | OUTPATIENT
Start: 2019-01-07 | End: 2019-01-07

## 2019-01-07 RX ORDER — ASPIRIN/CALCIUM CARB/MAGNESIUM 324 MG
325 TABLET ORAL
Qty: 0 | Refills: 0 | Status: DISCONTINUED | OUTPATIENT
Start: 2019-01-07 | End: 2019-01-08

## 2019-01-07 RX ORDER — ZOLPIDEM TARTRATE 10 MG/1
5 TABLET ORAL AT BEDTIME
Qty: 0 | Refills: 0 | Status: DISCONTINUED | OUTPATIENT
Start: 2019-01-07 | End: 2019-01-08

## 2019-01-07 RX ORDER — FLUOXETINE HCL 10 MG
10 CAPSULE ORAL DAILY
Qty: 0 | Refills: 0 | Status: DISCONTINUED | OUTPATIENT
Start: 2019-01-07 | End: 2019-01-08

## 2019-01-07 RX ADMIN — Medication 15 MILLIGRAM(S): at 17:00

## 2019-01-07 RX ADMIN — OXYCODONE HYDROCHLORIDE 10 MILLIGRAM(S): 5 TABLET ORAL at 15:00

## 2019-01-07 RX ADMIN — Medication 650 MILLIGRAM(S): at 17:30

## 2019-01-07 RX ADMIN — Medication 325 MILLIGRAM(S): at 16:46

## 2019-01-07 RX ADMIN — Medication 2000 MILLIGRAM(S): at 21:20

## 2019-01-07 RX ADMIN — Medication 1000 MILLIGRAM(S): at 08:26

## 2019-01-07 RX ADMIN — Medication 100 MILLIGRAM(S): at 14:21

## 2019-01-07 RX ADMIN — Medication 15 MILLIGRAM(S): at 16:46

## 2019-01-07 RX ADMIN — Medication 100 MILLIGRAM(S): at 21:20

## 2019-01-07 RX ADMIN — OXYCODONE HYDROCHLORIDE 10 MILLIGRAM(S): 5 TABLET ORAL at 14:21

## 2019-01-07 RX ADMIN — CELECOXIB 400 MILLIGRAM(S): 200 CAPSULE ORAL at 08:24

## 2019-01-07 RX ADMIN — SODIUM CHLORIDE 125 MILLILITER(S): 9 INJECTION, SOLUTION INTRAVENOUS at 16:46

## 2019-01-07 RX ADMIN — Medication 650 MILLIGRAM(S): at 16:47

## 2019-01-07 NOTE — DISCHARGE NOTE ADULT - CARE PROVIDER_API CALL
Rodolfo Andujar), Orthopaedic Surgery  [address]  [address]  [place], [place] [address]  Phone: [phone]  Fax: [phone]

## 2019-01-07 NOTE — DISCHARGE NOTE ADULT - MEDICATION SUMMARY - MEDICATIONS TO TAKE
I will START or STAY ON the medications listed below when I get home from the hospital:    celecoxib 200 mg oral capsule  -- 1 cap(s) by mouth every 12 hours, please continue for 6 weeks post surgery  -- Indication: For Pain    Percocet 5/325 oral tablet  -- 1 tab po q4h prn, max 6 tabs daily, wean off as kika   -- Indication: For Moderate to severe pain    acetaminophen 325 mg oral tablet  -- 2 tab(s) by mouth every 6 hours, As Needed for mild pain (1-3) or fever (temperature greater than 100.3F), max daily 3000mg  -- Indication: For Mild pain or fever - please purchase over the counter    Ecotrin 325 mg oral delayed release tablet  -- 1 tab by mouth every 12 hours, please continue for 4 weeks post surgery  -- Indication: For Blood clot prevention    FLUoxetine 10 mg oral tablet  -- 1 tab(s) by mouth once a day  -- Indication: For Antidepressant    zolpidem 5 mg oral tablet  -- 1 tab(s) by mouth once a day (at bedtime), As Needed  -- Indication: For Insomnia    docusate sodium 100 mg oral capsule  -- 1 cap(s) by mouth 3 times a day, As Needed for constipation  -- Indication: For Constipation - please purchase over the counter    polyethylene glycol 3350 oral powder for reconstitution  -- 17 gram(s) by mouth once a day, As Needed for constipation  -- Indication: For Constipation - please purchase over the counter    senna oral tablet  -- 2 tab(s) by mouth once a day (at bedtime), As needed, Constipation  -- Indication: For Constipation - please purchase over the counter    pantoprazole 40 mg oral delayed release tablet  -- 1 tab(s) by mouth once a day, please continue for 4 weeks post surgery  -- Indication: For Prevention of stomach irritation

## 2019-01-07 NOTE — DISCHARGE NOTE ADULT - HOSPITAL COURSE
Admit to Orthopedics  OR for left MARTÍN   Periop Antibx   DVT ppx  PT WBAT OOB   Pain mgt Admitted  Surgery - underwent L MARTÍN, DAA  Yola-op Antibiotics  Pain control  DVT prophylaxis  OOB/Physical Therapy  Consult: Dr. Eastman

## 2019-01-07 NOTE — DISCHARGE NOTE ADULT - PLAN OF CARE
Improve pain and function No strenuous activity, heavy lifting, driving or returning to work until cleared by MD.  You may shower - dressing is water-resistant, no soaking in bathtubs.  Remove dressing after post op day 5-7, then leave incision open to air. Keep incision clean and dry.  Try to have regular bowel movements, take stool softener or laxative if necessary.  May take Pepcid or Zantac for upset stomach.  May take Aleve or Naproxen instead of Meloxicam.  Swelling may travel all the way down leg to foot, this is normal and will subside in a few weeks.  Call to schedule an appt with Dr. Andujar for follow up, if you have staples or sutures they will be removed in office.  Contact your doctor if you experience: fever greater than 101.5, chills, chest pain, difficulty breathing, redness or excessive drainage around the incision, other concerns. See below

## 2019-01-07 NOTE — PROGRESS NOTE ADULT - SUBJECTIVE AND OBJECTIVE BOX
Orthopedics Post Op Check    Procedure: Left Total Hip Replacement - Anterior Approach  Surgeon: Pratima    Pt without complaints  Denies CP, SOB, N/V, numbness/tingling     Vital Signs Last 24 Hrs  T(C): 35.9 (07 Jan 2019 11:28), Max: 35.9 (07 Jan 2019 11:28)  T(F): 96.7 (07 Jan 2019 11:28), Max: 96.7 (07 Jan 2019 11:28)  HR: 64 (07 Jan 2019 12:35) (60 - 78)  BP: 120/60 (07 Jan 2019 12:35) (100/53 - 120/60)  BP(mean): 81 (07 Jan 2019 12:20) (72 - 81)  RR: 17 (07 Jan 2019 12:35) (14 - 23)  SpO2: 98% (07 Jan 2019 12:35) (96% - 99%)    Pt stable, AAOx3, laying comfortable in bed    Left Hip: Aquacel Dressing C/D/I    Pulses: DP/PT 2+ B/L LES, WWP  SLT:  B/L LES  Motor:  EHL/FHL/TA/GS  5/5 B/L LES                           11.2   4.8   )-----------( 168      ( 07 Jan 2019 12:46 )             32.4         Post op XR: Left MARTÍN prothesis in place    A/P: 74 yo Female POD#0 s/p Left MARTÍN w/ Anterior approach  - Stable  - Pain Control  - DVT ppx: ASA 325mg BID  - Post op abx: Ancef 2g Q8H x 2   - PT, dispo pending PT eval  - WBS: WBAT  - F/U AM Labs Orthopedics Post Op Check    Procedure: Left Total Hip Replacement - Anterior Approach  Surgeon: Pratima    Pt without complaints  Denies CP, SOB, N/V, numbness/tingling     Vital Signs Last 24 Hrs  T(C): 35.9 (07 Jan 2019 11:28), Max: 35.9 (07 Jan 2019 11:28)  T(F): 96.7 (07 Jan 2019 11:28), Max: 96.7 (07 Jan 2019 11:28)  HR: 64 (07 Jan 2019 12:35) (60 - 78)  BP: 120/60 (07 Jan 2019 12:35) (100/53 - 120/60)  BP(mean): 81 (07 Jan 2019 12:20) (72 - 81)  RR: 17 (07 Jan 2019 12:35) (14 - 23)  SpO2: 98% (07 Jan 2019 12:35) (96% - 99%)    Pt stable, AAOx3, laying comfortable in bed    Left Hip: Aquacel Dressing C/D/I    Pulses: DP/PT 2+ B/L LES, WWP  SLT:  B/L LES  Motor:  EHL/FHL/TA/GS  5/5 B/L LES                           11.2   4.8   )-----------( 168      ( 07 Jan 2019 12:46 )             32.4         Post op XR: Left MARTÍN prothesis in place    A/P: 72 yo Female POD#0 s/p Left MARTÍN w/ Anterior approach  - Stable  - Pain Control  - DVT ppx: ASA 325mg BID  - Post op abx: Ancef 2g Q8H x 2   - PT, dispo pending PT eval  - WBS: WBAT  - F/U AM Labs    I was present at time of exam and agree with the above - R. Goldberg PA-C

## 2019-01-07 NOTE — DISCHARGE NOTE ADULT - ADDITIONAL INSTRUCTIONS
**Please take pain medications as prescribed by Dr. Andujar.  **Your medications have been sent to [facility], please  at the time of discharge.  You may refer to Dr. Andujar's separate instruction packet for further information.    No strenuous activity, heavy lifting, driving or returning to work until cleared by MD. You may shower - dressing is water-resistant, no soaking in bathtubs. Remove dressing after post op day 5-7, then leave incision open to air. Keep incision clean and dry. Swelling may travel all the way down leg to foot, this is normal and will subside in a few weeks.    Try to have regular bowel movements, take stool softener or laxative if necessary. May take Pepcid or Zantac for upset stomach.    Call to schedule an appointment with Dr. Andujar for follow up, if you have staples or sutures they will be removed in office.    Contact your doctor if you experience: fever greater than 101.5, chills, chest pain, difficulty breathing, redness or excessive drainage around the incision, other concerns.     Please follow up with your Primary Care Provider within the next 7-10 days. Your white blood cell count was 5.2 (within normal limits) on 1/8/19.

## 2019-01-07 NOTE — DISCHARGE NOTE ADULT - CARE PLAN
Principal Discharge DX:	Primary osteoarthritis of left hip  Goal:	Improve pain and function  Assessment and plan of treatment:	No strenuous activity, heavy lifting, driving or returning to work until cleared by MD.  You may shower - dressing is water-resistant, no soaking in bathtubs.  Remove dressing after post op day 5-7, then leave incision open to air. Keep incision clean and dry.  Try to have regular bowel movements, take stool softener or laxative if necessary.  May take Pepcid or Zantac for upset stomach.  May take Aleve or Naproxen instead of Meloxicam.  Swelling may travel all the way down leg to foot, this is normal and will subside in a few weeks.  Call to schedule an appt with Dr. Andujar for follow up, if you have staples or sutures they will be removed in office.  Contact your doctor if you experience: fever greater than 101.5, chills, chest pain, difficulty breathing, redness or excessive drainage around the incision, other concerns. Principal Discharge DX:	Primary osteoarthritis of left hip  Goal:	Improve pain and function  Assessment and plan of treatment:	See below

## 2019-01-07 NOTE — DISCHARGE NOTE ADULT - NS AS ACTIVITY OBS
No Heavy lifting/straining/Showering allowed/Do not drive or operate machinery No Heavy lifting/straining/Do not make important decisions/Do not drive or operate machinery

## 2019-01-07 NOTE — BRIEF OPERATIVE NOTE - PROCEDURE
<<-----Click on this checkbox to enter Procedure Total hip arthroplasty by direct anterior approach  01/07/2019    Active  [place]

## 2019-01-07 NOTE — PHYSICAL THERAPY INITIAL EVALUATION ADULT - RANGE OF MOTION EXAMINATION, REHAB EVAL
bilateral upper extremity ROM was WNL (within normal limits)/Right LE ROM was WNL (within normal limits)/Left LE not tested

## 2019-01-07 NOTE — DISCHARGE NOTE ADULT - PATIENT PORTAL LINK FT
You can access the [facility] Patient Portal, offered by [facility], by registering with the following website: [email or web address]

## 2019-01-08 VITALS
HEART RATE: 78 BPM | SYSTOLIC BLOOD PRESSURE: 100 MMHG | OXYGEN SATURATION: 99 % | DIASTOLIC BLOOD PRESSURE: 54 MMHG | TEMPERATURE: 98 F | RESPIRATION RATE: 15 BRPM

## 2019-01-08 LAB
ANION GAP SERPL CALC-SCNC: 10 MMOL/L — SIGNIFICANT CHANGE UP (ref 5–17)
BUN SERPL-MCNC: 14 MG/DL — SIGNIFICANT CHANGE UP (ref 7–23)
CALCIUM SERPL-MCNC: 8.9 MG/DL — SIGNIFICANT CHANGE UP (ref 8.4–10.5)
CHLORIDE SERPL-SCNC: 105 MMOL/L — SIGNIFICANT CHANGE UP (ref 96–108)
CO2 SERPL-SCNC: 25 MMOL/L — SIGNIFICANT CHANGE UP (ref 22–31)
CREAT SERPL-MCNC: 0.75 MG/DL — SIGNIFICANT CHANGE UP (ref 0.5–1.3)
GLUCOSE SERPL-MCNC: 118 MG/DL — HIGH (ref 70–99)
HCT VFR BLD CALC: 27 % — LOW (ref 34.5–45)
HGB BLD-MCNC: 9.2 G/DL — LOW (ref 11.5–15.5)
MCHC RBC-ENTMCNC: 32.9 PG — SIGNIFICANT CHANGE UP (ref 27–34)
MCHC RBC-ENTMCNC: 34.1 G/DL — SIGNIFICANT CHANGE UP (ref 32–36)
MCV RBC AUTO: 96.4 FL — SIGNIFICANT CHANGE UP (ref 80–100)
PLATELET # BLD AUTO: 157 K/UL — SIGNIFICANT CHANGE UP (ref 150–400)
POTASSIUM SERPL-MCNC: 4.5 MMOL/L — SIGNIFICANT CHANGE UP (ref 3.5–5.3)
POTASSIUM SERPL-SCNC: 4.5 MMOL/L — SIGNIFICANT CHANGE UP (ref 3.5–5.3)
RBC # BLD: 2.8 M/UL — LOW (ref 3.8–5.2)
RBC # FLD: 13.4 % — SIGNIFICANT CHANGE UP (ref 10.3–16.9)
SODIUM SERPL-SCNC: 140 MMOL/L — SIGNIFICANT CHANGE UP (ref 135–145)
SURGICAL PATHOLOGY STUDY: SIGNIFICANT CHANGE UP
WBC # BLD: 5.2 K/UL — SIGNIFICANT CHANGE UP (ref 3.8–10.5)
WBC # FLD AUTO: 5.2 K/UL — SIGNIFICANT CHANGE UP (ref 3.8–10.5)

## 2019-01-08 PROCEDURE — C1776: CPT

## 2019-01-08 PROCEDURE — 80048 BASIC METABOLIC PNL TOTAL CA: CPT

## 2019-01-08 PROCEDURE — 86850 RBC ANTIBODY SCREEN: CPT

## 2019-01-08 PROCEDURE — 99233 SBSQ HOSP IP/OBS HIGH 50: CPT

## 2019-01-08 PROCEDURE — 72170 X-RAY EXAM OF PELVIS: CPT

## 2019-01-08 PROCEDURE — C1713: CPT

## 2019-01-08 PROCEDURE — 88300 SURGICAL PATH GROSS: CPT

## 2019-01-08 PROCEDURE — 86900 BLOOD TYPING SEROLOGIC ABO: CPT

## 2019-01-08 PROCEDURE — 36415 COLL VENOUS BLD VENIPUNCTURE: CPT

## 2019-01-08 PROCEDURE — 97161 PT EVAL LOW COMPLEX 20 MIN: CPT

## 2019-01-08 PROCEDURE — 76000 FLUOROSCOPY <1 HR PHYS/QHP: CPT

## 2019-01-08 PROCEDURE — 86901 BLOOD TYPING SEROLOGIC RH(D): CPT

## 2019-01-08 PROCEDURE — 97116 GAIT TRAINING THERAPY: CPT

## 2019-01-08 PROCEDURE — 85027 COMPLETE CBC AUTOMATED: CPT

## 2019-01-08 RX ORDER — CELECOXIB 200 MG/1
1 CAPSULE ORAL
Qty: 0 | Refills: 0 | DISCHARGE
Start: 2019-01-08

## 2019-01-08 RX ORDER — SENNA PLUS 8.6 MG/1
2 TABLET ORAL
Qty: 0 | Refills: 0 | DISCHARGE
Start: 2019-01-08

## 2019-01-08 RX ORDER — ACETAMINOPHEN 500 MG
2 TABLET ORAL
Qty: 0 | Refills: 0 | DISCHARGE
Start: 2019-01-08

## 2019-01-08 RX ORDER — DOCUSATE SODIUM 100 MG
1 CAPSULE ORAL
Qty: 0 | Refills: 0 | DISCHARGE
Start: 2019-01-08

## 2019-01-08 RX ORDER — POLYETHYLENE GLYCOL 3350 17 G/17G
17 POWDER, FOR SOLUTION ORAL
Qty: 0 | Refills: 0 | DISCHARGE
Start: 2019-01-08

## 2019-01-08 RX ADMIN — Medication 15 MILLIGRAM(S): at 00:38

## 2019-01-08 RX ADMIN — Medication 650 MILLIGRAM(S): at 11:10

## 2019-01-08 RX ADMIN — Medication 15 MILLIGRAM(S): at 11:25

## 2019-01-08 RX ADMIN — Medication 2000 MILLIGRAM(S): at 05:09

## 2019-01-08 RX ADMIN — ZOLPIDEM TARTRATE 5 MILLIGRAM(S): 10 TABLET ORAL at 00:38

## 2019-01-08 RX ADMIN — Medication 650 MILLIGRAM(S): at 05:09

## 2019-01-08 RX ADMIN — Medication 325 MILLIGRAM(S): at 05:09

## 2019-01-08 RX ADMIN — Medication 650 MILLIGRAM(S): at 05:50

## 2019-01-08 RX ADMIN — Medication 650 MILLIGRAM(S): at 00:38

## 2019-01-08 RX ADMIN — Medication 650 MILLIGRAM(S): at 01:15

## 2019-01-08 RX ADMIN — Medication 100 MILLIGRAM(S): at 05:09

## 2019-01-08 RX ADMIN — Medication 15 MILLIGRAM(S): at 01:15

## 2019-01-08 RX ADMIN — Medication 650 MILLIGRAM(S): at 12:00

## 2019-01-08 RX ADMIN — Medication 15 MILLIGRAM(S): at 05:50

## 2019-01-08 RX ADMIN — Medication 15 MILLIGRAM(S): at 11:10

## 2019-01-08 RX ADMIN — Medication 15 MILLIGRAM(S): at 05:09

## 2019-01-08 NOTE — CONSULT NOTE ADULT - ASSESSMENT
72 yo F with anxiety, HLD, OA with severe left hip pain now s/p left THR  1) Anxiety- continue fluoxetine daily  2) HLD- not on statin  3) Pain management / bowel regimen  4) DVT ppx with asa bid and mechanical scds while in bed

## 2019-01-08 NOTE — CONSULT NOTE ADULT - SUBJECTIVE AND OBJECTIVE BOX
Patient seen and examined, Chart reviewed    HPI:  73F c/o left hip pain x 5-6 years, worsening over the last 9 months. The patient states that she injured her left hip while playing tennis x 6 years ago and has a history of worsening osteoarthritis in the left hip. She states her pain is localized to her right hip and complains of only occasional numbness/tingling down the left lower extremity. She states that the pain is worsened after walking half a block and that she sometimes uses a wheelchair or an assistive person to ambulate. Denies use of cane or walker. The patient states she failed conservative therapies including physical therapy for her symptoms. The patient states she occasionally takes Tylenol for her pain. No hx of blood clot.     She is today POD #1 from an elective left total hip replacement.      PAST MEDICAL & SURGICAL HISTORY:  HLD (hyperlipidemia)  Anxiety  OA (osteoarthritis)  History of tonsillectomy  History of facelift  H/O: : x 2      REVIEW OF SYSTEMS:  CONSTITUTIONAL:  No night sweats.  No fatigue,  No fever or chills.  HEENT:  Eyes:  No visual changes.  No eye pain.  No eye discharge.    RESPIRATORY:  No cough.  No wheeze.    No shortness of breath.  CARDIOVASCULAR:  No chest pains.  No palpitations.  GASTROINTESTINAL:  No abdominal pain.  No nausea or vomiting.  No diarrhea    GENITOURINARY:  No urgency.  No frequency.  No dysuria.  No hematuria.    MUSCULOSKELETAL: minimal hip pain  SKIN:  No rashes.      acetaminophen   Tablet .. 650 milliGRAM(s) Oral every 6 hours  aluminum hydroxide/magnesium hydroxide/simethicone Suspension 30 milliLiter(s) Oral four times a day PRN  aspirin enteric coated 325 milliGRAM(s) Oral two times a day  BUpivacaine liposome 1.3% Injectable (no eMAR) 20 milliLiter(s) Local Injection once  celecoxib 200 milliGRAM(s) Oral two times a day  docusate sodium 100 milliGRAM(s) Oral three times a day  FLUoxetine 10 milliGRAM(s) Oral daily  ketorolac   Injectable 15 milliGRAM(s) IV Push every 6 hours  lactated ringers. 1000 milliLiter(s) IV Continuous <Continuous>  magnesium hydroxide Suspension 30 milliLiter(s) Oral daily PRN  morphine  - Injectable 2 milliGRAM(s) IV Push every 4 hours PRN  ondansetron Injectable 4 milliGRAM(s) IV Push every 6 hours PRN  oxyCODONE    IR 5 milliGRAM(s) Oral every 4 hours PRN  oxyCODONE    IR 10 milliGRAM(s) Oral every 4 hours PRN  polyethylene glycol 3350 17 Gram(s) Oral daily  senna 2 Tablet(s) Oral at bedtime PRN  zolpidem 5 milliGRAM(s) Oral at bedtime PRN      Allergies    No Known Allergies    Intolerances        SOCIAL HISTORY:  no tob  FAMILY HISTORY:  nc      Vital Signs Last 24 Hrs  T(C): 36.7 (2019 04:57), Max: 36.7 (2019 00:37)  T(F): 98 (2019 04:57), Max: 98.1 (2019 00:37)  HR: 70 (2019 04:57) (60 - 80)  BP: 103/63 (2019 04:57) (97/57 - 120/60)  BP(mean): 81 (2019 12:20) (72 - 81)  RR: 16 (2019 04:57) (14 - 23)  SpO2: 97% (2019 04:57) (96% - 99%)     @ 07:01  -  -08 @ 06:57  --------------------------------------------------------  IN: 2475 mL / OUT: 2000 mL / NET: 475 mL        PHYSICAL EXAM:   General - NAD, Alert and oriented x 3,   Neck - - No lymphadenopathy  Cardiovascular - RRR no m/r/g, no JVD  Lungs - Clear to auscultation, no use of accessory muscles, no crackles or wheezes.  Skin - No rashes,   Abdomen - Normal bowel sounds, abdomen soft and nontender.  Extremeties - No edema, cyanosis or clubbing  Neurological – no focal deficits      LABS:                        11.2   4.8   )-----------( 168      ( 2019 12:46 )             32.4                 RADIOLOGY & ADDITIONAL STUDIES:

## 2019-01-08 NOTE — PROGRESS NOTE ADULT - SUBJECTIVE AND OBJECTIVE BOX
S: No acute events overnight.    Vital Signs Last 24 Hrs  T(C): 36.7 (08 Jan 2019 04:57), Max: 36.7 (08 Jan 2019 00:37)  T(F): 98 (08 Jan 2019 04:57), Max: 98.1 (08 Jan 2019 00:37)  HR: 70 (08 Jan 2019 04:57) (60 - 80)  BP: 103/63 (08 Jan 2019 04:57) (97/57 - 120/60)  BP(mean): 81 (07 Jan 2019 12:20) (72 - 81)  RR: 16 (08 Jan 2019 04:57) (14 - 23)  SpO2: 97% (08 Jan 2019 04:57) (96% - 99%)  I&O's Summary    07 Jan 2019 07:01  -  08 Jan 2019 06:40  --------------------------------------------------------  IN: 2350 mL / OUT: 2000 mL / NET: 350 mL        Dressing CDI (L hip)  Motor: 5/5 GS/TA/EHL/FHL    Sensation: SILT sural, saph, DP, SP and tibial n distribution  Pulses: WWP, DP/PT 2+                            11.2   4.8   )-----------( 168      ( 07 Jan 2019 12:46 )             32.4             MEDICATIONS  (STANDING):  acetaminophen   Tablet .. 650 milliGRAM(s) Oral every 6 hours  aspirin enteric coated 325 milliGRAM(s) Oral two times a day  BUpivacaine liposome 1.3% Injectable (no eMAR) 20 milliLiter(s) Local Injection once  celecoxib 200 milliGRAM(s) Oral two times a day  docusate sodium 100 milliGRAM(s) Oral three times a day  FLUoxetine 10 milliGRAM(s) Oral daily  ketorolac   Injectable 15 milliGRAM(s) IV Push every 6 hours  lactated ringers. 1000 milliLiter(s) (125 mL/Hr) IV Continuous <Continuous>  polyethylene glycol 3350 17 Gram(s) Oral daily    MEDICATIONS  (PRN):  aluminum hydroxide/magnesium hydroxide/simethicone Suspension 30 milliLiter(s) Oral four times a day PRN Indigestion  magnesium hydroxide Suspension 30 milliLiter(s) Oral daily PRN Constipation  morphine  - Injectable 2 milliGRAM(s) IV Push every 4 hours PRN Severe Pain (7 - 10)  ondansetron Injectable 4 milliGRAM(s) IV Push every 6 hours PRN Nausea and/or Vomiting  oxyCODONE    IR 5 milliGRAM(s) Oral every 4 hours PRN Mild Pain (1 - 3)  oxyCODONE    IR 10 milliGRAM(s) Oral every 4 hours PRN Moderate Pain (4 - 6)  senna 2 Tablet(s) Oral at bedtime PRN Constipation  zolpidem 5 milliGRAM(s) Oral at bedtime PRN Insomnia      A/P:  73y Female s/p L anterior MARTÍN, POD 1  -Stable  -Pain Control  -PT/WBAT  -DVT ppx: ASA  -f/u labs  -Dispo: Home w PT

## 2019-01-09 ENCOUNTER — MOBILE ON CALL (OUTPATIENT)
Age: 74
End: 2019-01-09

## 2019-01-09 ENCOUNTER — APPOINTMENT (OUTPATIENT)
Age: 74
End: 2019-01-09
Payer: MEDICARE

## 2019-01-09 VITALS
HEART RATE: 78 BPM | SYSTOLIC BLOOD PRESSURE: 98 MMHG | TEMPERATURE: 97.2 F | RESPIRATION RATE: 16 BRPM | DIASTOLIC BLOOD PRESSURE: 60 MMHG

## 2019-01-09 PROBLEM — E78.5 HYPERLIPIDEMIA, UNSPECIFIED: Chronic | Status: ACTIVE | Noted: 2019-01-04

## 2019-01-09 PROBLEM — F41.9 ANXIETY DISORDER, UNSPECIFIED: Chronic | Status: ACTIVE | Noted: 2019-01-04

## 2019-01-09 PROBLEM — M19.90 UNSPECIFIED OSTEOARTHRITIS, UNSPECIFIED SITE: Chronic | Status: ACTIVE | Noted: 2019-01-04

## 2019-01-09 PROCEDURE — 99347 HOME/RES VST EST SF MDM 20: CPT

## 2019-01-10 DIAGNOSIS — D64.9 ANEMIA, UNSPECIFIED: ICD-10-CM

## 2019-01-10 DIAGNOSIS — G47.00 INSOMNIA, UNSPECIFIED: ICD-10-CM

## 2019-01-10 DIAGNOSIS — F41.9 ANXIETY DISORDER, UNSPECIFIED: ICD-10-CM

## 2019-01-10 DIAGNOSIS — M16.12 UNILATERAL PRIMARY OSTEOARTHRITIS, LEFT HIP: ICD-10-CM

## 2019-01-10 DIAGNOSIS — E78.5 HYPERLIPIDEMIA, UNSPECIFIED: ICD-10-CM

## 2019-02-06 ENCOUNTER — FORM ENCOUNTER (OUTPATIENT)
Age: 74
End: 2019-02-06

## 2019-02-07 ENCOUNTER — APPOINTMENT (OUTPATIENT)
Dept: ORTHOPEDIC SURGERY | Facility: CLINIC | Age: 74
End: 2019-02-07
Payer: MEDICARE

## 2019-02-07 ENCOUNTER — OUTPATIENT (OUTPATIENT)
Dept: OUTPATIENT SERVICES | Facility: HOSPITAL | Age: 74
LOS: 1 days | End: 2019-02-07
Payer: MEDICARE

## 2019-02-07 DIAGNOSIS — Z90.89 ACQUIRED ABSENCE OF OTHER ORGANS: Chronic | ICD-10-CM

## 2019-02-07 DIAGNOSIS — Z98.890 OTHER SPECIFIED POSTPROCEDURAL STATES: Chronic | ICD-10-CM

## 2019-02-07 DIAGNOSIS — Z98.891 HISTORY OF UTERINE SCAR FROM PREVIOUS SURGERY: Chronic | ICD-10-CM

## 2019-02-07 PROCEDURE — 73501 X-RAY EXAM HIP UNI 1 VIEW: CPT | Mod: 26,LT

## 2019-02-07 PROCEDURE — 73502 X-RAY EXAM HIP UNI 2-3 VIEWS: CPT

## 2019-02-07 PROCEDURE — 73501 X-RAY EXAM HIP UNI 1 VIEW: CPT

## 2019-02-07 PROCEDURE — 73502 X-RAY EXAM HIP UNI 2-3 VIEWS: CPT | Mod: 26,LT

## 2019-02-07 PROCEDURE — 99024 POSTOP FOLLOW-UP VISIT: CPT

## 2019-02-07 RX ORDER — OXYCODONE AND ACETAMINOPHEN 5; 325 MG/1; MG/1
5-325 TABLET ORAL
Qty: 70 | Refills: 0 | Status: DISCONTINUED | COMMUNITY
Start: 2019-01-07 | End: 2019-02-07

## 2019-02-07 NOTE — HISTORY OF PRESENT ILLNESS
[___ Months Post Op] : [unfilled] months post op [Doing Well] : is doing well [Excellent Pain Control] : has excellent pain control [No Sign of Infection] : is showing no signs of infection [de-identified] : First post op visit left total hip replacement, anterior, surgical date 01/07/19 [de-identified] : Patient presents for first post op visit s/p left anterior MARTÍN 1/7/19. She states she is doing very well. She has minimal pain well controlled without medication. SHe ambulates with  a cane for long distances but is able to walk short distances unassisted with ease. She has been doing HEP and is able to put shoes and socks on.  [de-identified] : Patient is alert and oriented x3.\par Leg lengths clinically equal.\par Left hip: Well-healed anterior surgical scar without erythema, ecchymosis or effusion. No focal tenderness. ROM from full extension to 95 degrees of flexion, external rotation 50 degrees, internal rotation 10, abduction 50, adduction 15. Flexor power 5/5. Calf soft and nontender.\par NVI. [de-identified] : X-rays taken today demonstrate well-fixed left total hip replacement in overall good alignment [de-identified] : Patient is doing well 4 weeks s/p left MARTÍN. Advised to gradually increase activity as tolerated. Continue Celebrex PRN. Follow up in 4-6 weeks, sooner with any issues or concerns.

## 2019-03-19 ENCOUNTER — APPOINTMENT (OUTPATIENT)
Dept: ORTHOPEDIC SURGERY | Facility: CLINIC | Age: 74
End: 2019-03-19
Payer: MEDICARE

## 2019-03-19 DIAGNOSIS — M21.619 BUNION OF UNSPECIFIED FOOT: ICD-10-CM

## 2019-03-19 PROCEDURE — 99024 POSTOP FOLLOW-UP VISIT: CPT

## 2019-03-19 NOTE — HISTORY OF PRESENT ILLNESS
[Healed] : healed [Doing Well] : is doing well [Excellent Pain Control] : has excellent pain control [No Sign of Infection] : is showing no signs of infection [Chills] : no chills [Fever] : no fever [de-identified] : second post op s/p left total hip replacement, anterior, surgical date 1/7/19 [de-identified] : 75 y/o F presents today for second post op s/p left THR 1/7/19. She is doing well overall and reports being very satisfied with the surgery. She reports some mild left hip pain with activity but notes that this continues to subside. She also reports some mild difficulty placing shoes on the left foot. She can walk 5-10 blocks with a slight limp and uses stairs normally. She swims for exercise often. Patient notes now that she is able to exercise more, she notices more right knee and lower back pain. \par Of note, patient wants to have bunion surgery and would like to be referred to a specialist. [de-identified] : Patient is alert and oriented x3.\par Leg lengths clinically equal.\par Left hip: Well-healed surgical scar without erythema or ecchymosis. Less than 1 mm scab along incision line. Acceptable post-op swelling. ROM from full extension to 95 degrees of flexion. 5 degrees of internal rotation. 45 degrees of external rotation (with relaxation). 50 degrees of abduction. 0 degrees of adduction.\par Calf is soft and nontender.\par NVI. [de-identified] : No new imaging was reviewed at this time. [de-identified] : Ms. Wayne is s/p left THR and is doing well overall. We discussed the patient's recovery, what types of activities she will eventually be able to do, and when she should expect to be able to do them. I advised her to increase activity incrementally. I showed her some stretches she should do to regain hip flexion.\par I referred the patient to a few podiatrists and surgeons that she can consult with about her bunions. \par Follow up in 2 months, 1 year post surgery or prn.

## 2019-03-19 NOTE — END OF VISIT
[FreeTextEntry3] : All medical record entries made by the Scribe were at my, Dr. Andujar's, discretion and personally dictated by me on 03/19/2019. I have reviewed the chart and agree that the record accurately reflects my personal performance of the history, physical exam, assessment and plan. I have also personally directed, reviewed and agreed to the chart.

## 2019-03-19 NOTE — ADDENDUM
[FreeTextEntry1] : This note was written by Yvette Moreno on 03/19/2019  acting as scribe for Dr. Andujar and JAYLYN Talavera.\par

## 2019-04-07 ENCOUNTER — FORM ENCOUNTER (OUTPATIENT)
Age: 74
End: 2019-04-07

## 2019-04-22 ENCOUNTER — MESSAGE (OUTPATIENT)
Age: 74
End: 2019-04-22

## 2019-04-29 ENCOUNTER — RX RENEWAL (OUTPATIENT)
Age: 74
End: 2019-04-29

## 2019-05-30 ENCOUNTER — FORM ENCOUNTER (OUTPATIENT)
Age: 74
End: 2019-05-30

## 2019-05-31 ENCOUNTER — APPOINTMENT (OUTPATIENT)
Dept: ORTHOPEDIC SURGERY | Facility: CLINIC | Age: 74
End: 2019-05-31
Payer: MEDICARE

## 2019-05-31 ENCOUNTER — OUTPATIENT (OUTPATIENT)
Dept: OUTPATIENT SERVICES | Facility: HOSPITAL | Age: 74
LOS: 1 days | End: 2019-05-31
Payer: MEDICARE

## 2019-05-31 VITALS — HEIGHT: 64 IN | WEIGHT: 125 LBS | BODY MASS INDEX: 21.34 KG/M2

## 2019-05-31 DIAGNOSIS — Z96.642 AFTERCARE FOLLOWING JOINT REPLACEMENT SURGERY: ICD-10-CM

## 2019-05-31 DIAGNOSIS — M16.12 UNILATERAL PRIMARY OSTEOARTHRITIS, LEFT HIP: ICD-10-CM

## 2019-05-31 DIAGNOSIS — Z98.891 HISTORY OF UTERINE SCAR FROM PREVIOUS SURGERY: Chronic | ICD-10-CM

## 2019-05-31 DIAGNOSIS — Z47.1 AFTERCARE FOLLOWING JOINT REPLACEMENT SURGERY: ICD-10-CM

## 2019-05-31 DIAGNOSIS — Z98.890 OTHER SPECIFIED POSTPROCEDURAL STATES: Chronic | ICD-10-CM

## 2019-05-31 DIAGNOSIS — Z90.89 ACQUIRED ABSENCE OF OTHER ORGANS: Chronic | ICD-10-CM

## 2019-05-31 DIAGNOSIS — M79.18 MYALGIA, OTHER SITE: ICD-10-CM

## 2019-05-31 PROCEDURE — 72020 X-RAY EXAM OF SPINE 1 VIEW: CPT | Mod: 26

## 2019-05-31 PROCEDURE — 73501 X-RAY EXAM HIP UNI 1 VIEW: CPT

## 2019-05-31 PROCEDURE — 73501 X-RAY EXAM HIP UNI 1 VIEW: CPT | Mod: 26,LT

## 2019-05-31 PROCEDURE — 99213 OFFICE O/P EST LOW 20 MIN: CPT

## 2019-05-31 PROCEDURE — 72020 X-RAY EXAM OF SPINE 1 VIEW: CPT

## 2019-05-31 RX ORDER — ASPIRIN/ACETAMINOPHEN/CAFFEINE 500-325-65
325 POWDER IN PACKET (EA) ORAL
Qty: 60 | Refills: 0 | Status: DISCONTINUED | COMMUNITY
Start: 2019-01-07 | End: 2019-05-31

## 2019-05-31 RX ORDER — DOCUSATE SODIUM 100 MG/1
100 CAPSULE ORAL
Refills: 0 | Status: DISCONTINUED | COMMUNITY
End: 2019-05-31

## 2019-05-31 RX ORDER — LORATADINE 5 MG
17 TABLET,CHEWABLE ORAL
Refills: 0 | Status: DISCONTINUED | COMMUNITY
End: 2019-05-31

## 2019-05-31 RX ORDER — PANTOPRAZOLE 40 MG/1
40 TABLET, DELAYED RELEASE ORAL DAILY
Qty: 30 | Refills: 0 | Status: DISCONTINUED | COMMUNITY
Start: 2019-01-07 | End: 2019-05-31

## 2019-05-31 RX ORDER — CELECOXIB 200 MG/1
200 CAPSULE ORAL TWICE DAILY
Qty: 84 | Refills: 0 | Status: DISCONTINUED | COMMUNITY
Start: 2019-01-07 | End: 2019-05-31

## 2019-05-31 RX ORDER — SENNOSIDES 8.6 MG TABLETS 8.6 MG/1
8.6 TABLET ORAL
Refills: 0 | Status: DISCONTINUED | COMMUNITY
End: 2019-05-31

## 2019-05-31 NOTE — HISTORY OF PRESENT ILLNESS
[de-identified] : 74 year old female presents today for follow up evaluation of left hip s/p left anterior THR 1/7/19. She reports that she was doing well until she fell down the steps two weeks ago. Since then, she reports that she's been having left hip pain localized to the back of the hip ever since. Patient also reports that when she walks for more than a block her lower back tightens up.

## 2019-05-31 NOTE — END OF VISIT
[FreeTextEntry3] : All medical record entries made by Flori Moreno acting as a scribe for the performing provider (Rodolfo Andujar MD and/or JAYLYN Talavera) on 05/31/2019. All entries were dictated to me by the performing medical provider. In signing this record, the medical provider affirms that they have personally performed the history, physical exam, assessment and plan and have also directed, reviewed and agreed to the documentation in the chart.

## 2019-05-31 NOTE — REASON FOR VISIT
[Follow-Up Visit] : a follow-up visit for [FreeTextEntry2] : left hip X fall 2 weeks ago, s/p left total hip replacement, anterior, surgical date 1/7/19

## 2019-05-31 NOTE — PHYSICAL EXAM
[de-identified] : Well appearing. NAD. Alert and oriented x 3. No respiratory distress.\par Bilateral upper extremities: Skin intact. No deformity. Painless active ROM without evident restriction.\par Cervical, thoracic and lumbar spine: No visible deformity. Painless active ROM without evident restriction. No radicular pain on passive straight leg raise bilaterally.\par \par Pelvis: No pelvic obliquity. No tenderness.\par Leg lengths: Equal.\par \par Gait: Normal.\par Ambulatory assist devices: None.\par \par Right Hip:\par Skin intact. No surgical scars. No erythema. No ecchymosis. No swelling. No deformity. No focal tenderness.\par Painless ROM from full extension to 115 degrees of flexion. 30 degrees of internal rotation. 75-80 degrees of external rotation. 70 degrees of abduction. 10-15 degrees of adduction.\par ERNESTINA painless. Impingement (FADIR) painless. Stinchfield painless.\par No crepitation. No instability.\par \par Left Hip:\par Skin intact.\par Well healed surgical scar.\par No erythema. No ecchymosis. No swelling. No deformity.\par Mild tenderness along sacroiliac joint.\par Painful ROM from full extension to 100-105 degrees of flexion. 10 degrees of internal rotation. 60 degrees of external rotation. 55-60 degrees of abduction. 10 degrees of adduction.\par ERNESTINA painful. Impingement (FADIR) painful.\par Stinchfield painless.\par No crepitation. No instability.\par Abductor power 5-/5. Flexor power 5/5.\par \par Bilateral Knees: Skin intact. No surgical scars. No erythema or ecchymosis. No swelling or effusion. No deformity. Painless and unrestricted range of motion. Central patellar tracking. No crepitation. No instability. \par \par Neurological: Intact distal crude touch sensation. Normal distal motor power. \par Cardiovascular: Lower extremities warm and well perfused. No peripheral edema. [de-identified] : X-ray imaging of the AP pelvis and left hip done here today demonstrates well-fixed well aligned left THR, with a small calcific density adjacent to left greater trochanter, most likely representing heterotopic ossification, but a tiny avulsion off the greater trochanter cannot be excluded based on the h/o fall with increased pain in that area\par \par X-ray imaging of the lumbar spine done here today demonstrates multilevel degenerative changes, most significant at L2-3 where there is moderate to advanced DJD, no evidence of acute unstable compression fracture\par

## 2019-05-31 NOTE — DISCUSSION/SUMMARY
[de-identified] : Ms. Wayne is a 73 y/o F s/p left THR who suffered from a fall down the steps two weeks ago and now has left back and hip area pain as a result. I suspect she has a lumbar strain. I believe this will heal with time and advised her to take an anti-inflammatory twice a day for 1-2 weeks for the pain. If she still has pain after taking the anti-inflammatory, she can take Tylenol or a muscle relaxant. I wrote a prescription for cyclobenzaprine. I advised her to engage in a walking program and to increase the distance she walks everyday. She should avoid high impact activity and heavy lifting/carrying.\par Follow up in a few weeks if pain and symptoms continue.

## 2019-06-21 ENCOUNTER — APPOINTMENT (OUTPATIENT)
Dept: GASTROENTEROLOGY | Facility: CLINIC | Age: 74
End: 2019-06-21

## 2019-07-08 ENCOUNTER — RX RENEWAL (OUTPATIENT)
Age: 74
End: 2019-07-08

## 2019-07-16 ENCOUNTER — APPOINTMENT (OUTPATIENT)
Dept: PLASTIC SURGERY | Facility: CLINIC | Age: 74
End: 2019-07-16

## 2019-10-23 ENCOUNTER — RX RENEWAL (OUTPATIENT)
Age: 74
End: 2019-10-23

## 2020-03-06 ENCOUNTER — APPOINTMENT (OUTPATIENT)
Dept: INTERNAL MEDICINE | Facility: CLINIC | Age: 75
End: 2020-03-06
Payer: MEDICARE

## 2020-03-06 ENCOUNTER — LABORATORY RESULT (OUTPATIENT)
Age: 75
End: 2020-03-06

## 2020-03-06 VITALS
OXYGEN SATURATION: 97 % | SYSTOLIC BLOOD PRESSURE: 98 MMHG | BODY MASS INDEX: 21.34 KG/M2 | DIASTOLIC BLOOD PRESSURE: 64 MMHG | HEART RATE: 81 BPM | WEIGHT: 125 LBS | TEMPERATURE: 97.2 F | HEIGHT: 64 IN | RESPIRATION RATE: 16 BRPM

## 2020-03-06 PROCEDURE — G0439: CPT

## 2020-03-06 PROCEDURE — 36415 COLL VENOUS BLD VENIPUNCTURE: CPT

## 2020-03-06 PROCEDURE — 93000 ELECTROCARDIOGRAM COMPLETE: CPT

## 2020-03-07 ENCOUNTER — TRANSCRIPTION ENCOUNTER (OUTPATIENT)
Age: 75
End: 2020-03-07

## 2020-03-10 LAB
25(OH)D3 SERPL-MCNC: 33.8 NG/ML
ALBUMIN SERPL ELPH-MCNC: 4.7 G/DL
ALP BLD-CCNC: 62 U/L
ALT SERPL-CCNC: 14 U/L
ANION GAP SERPL CALC-SCNC: 17 MMOL/L
APPEARANCE: CLEAR
APTT BLD: 29.1 SEC
AST SERPL-CCNC: 29 U/L
BASOPHILS # BLD AUTO: 0 K/UL
BASOPHILS NFR BLD AUTO: 0 %
BILIRUB SERPL-MCNC: 0.8 MG/DL
BILIRUBIN URINE: NEGATIVE
BLOOD URINE: NEGATIVE
BUN SERPL-MCNC: 20 MG/DL
CALCIUM SERPL-MCNC: 9.6 MG/DL
CHLORIDE SERPL-SCNC: 100 MMOL/L
CHOLEST SERPL-MCNC: 238 MG/DL
CHOLEST/HDLC SERPL: 2.8 RATIO
CO2 SERPL-SCNC: 24 MMOL/L
COLOR: YELLOW
CREAT SERPL-MCNC: 0.83 MG/DL
EOSINOPHIL # BLD AUTO: 0.09 K/UL
EOSINOPHIL NFR BLD AUTO: 2.8 %
GLUCOSE QUALITATIVE U: NEGATIVE
GLUCOSE SERPL-MCNC: 85 MG/DL
HCT VFR BLD CALC: 41.1 %
HDLC SERPL-MCNC: 84 MG/DL
HGB BLD-MCNC: 13.2 G/DL
INR PPP: 1 RATIO
KETONES URINE: NORMAL
LDLC SERPL CALC-MCNC: 135 MG/DL
LEUKOCYTE ESTERASE URINE: NEGATIVE
LYMPHOCYTES # BLD AUTO: 1.02 K/UL
LYMPHOCYTES NFR BLD AUTO: 33.6 %
MAN DIFF?: NORMAL
MCHC RBC-ENTMCNC: 32.1 GM/DL
MCHC RBC-ENTMCNC: 35.4 PG
MCV RBC AUTO: 110.2 FL
MONOCYTES # BLD AUTO: 0.23 K/UL
MONOCYTES NFR BLD AUTO: 7.5 %
NEUTROPHILS # BLD AUTO: 1.71 K/UL
NEUTROPHILS NFR BLD AUTO: 56.1 %
NITRITE URINE: NEGATIVE
PH URINE: 7
PLATELET # BLD AUTO: 180 K/UL
POTASSIUM SERPL-SCNC: 4.3 MMOL/L
PROT SERPL-MCNC: 7.6 G/DL
PROTEIN URINE: NORMAL
PT BLD: 11.3 SEC
RBC # BLD: 3.73 M/UL
RBC # FLD: 14.2 %
SODIUM SERPL-SCNC: 140 MMOL/L
SPECIFIC GRAVITY URINE: 1.02
TRIGL SERPL-MCNC: 89 MG/DL
UROBILINOGEN URINE: NORMAL
WBC # FLD AUTO: 3.04 K/UL

## 2020-03-10 NOTE — ADDENDUM
[FreeTextEntry1] : EKG and labs have been reviewed.\par Patient is medically optimized to proceed with surgery.

## 2020-03-10 NOTE — HISTORY OF PRESENT ILLNESS
[FreeTextEntry1] : wellness and pre-op  [de-identified] : This is a 73 y/o female who presents to clinic for an annual physical. She's planning on getting a face & neck lift surgery at the end of the month, so she needs pre-operative clearance as well. Overall, physically, the patient states that she's doing well. She still walks daily, takes fluoxetine regularly and ambien on occasion. She is up to date on all her immunizations and has gotten a colonoscopy and mammogram in the past 2 years. She had a difficult year socially - 2 friends passed from breast cancer and her son got diagnosed with a rare brain disorder called susac syndrome. She's been flying back to  periodically to help her son and his family. She admits to being extremely stressed and fatigued, but is meditating a lot which has been helping.

## 2020-03-10 NOTE — PLAN
[FreeTextEntry1] : \par - Pre-Op: Will order labs today and send in medical clearance. EKG was done and did not present any abnormal findings.\par - Health Maintenance: Patient is up to date on all her immunizations. Discussed continuing exercise and maintaining a healthy diet when possible. Discussed stress and anxiety management - will continue fluoxetine and meditating. Patient will follow up in the summer to get another bone density scan done.

## 2020-08-09 ENCOUNTER — RX RENEWAL (OUTPATIENT)
Age: 75
End: 2020-08-09

## 2020-11-06 ENCOUNTER — APPOINTMENT (OUTPATIENT)
Dept: INTERNAL MEDICINE | Facility: CLINIC | Age: 75
End: 2020-11-06

## 2021-02-10 ENCOUNTER — EMERGENCY (EMERGENCY)
Facility: HOSPITAL | Age: 76
LOS: 1 days | Discharge: HOME | End: 2021-02-10
Attending: EMERGENCY MEDICINE
Payer: MEDICARE

## 2021-02-10 ENCOUNTER — TRANSCRIPTION ENCOUNTER (OUTPATIENT)
Age: 76
End: 2021-02-10

## 2021-02-10 VITALS
OXYGEN SATURATION: 95 % | SYSTOLIC BLOOD PRESSURE: 120 MMHG | TEMPERATURE: 99 F | HEIGHT: 64 IN | RESPIRATION RATE: 18 BRPM | WEIGHT: 119.93 LBS | DIASTOLIC BLOOD PRESSURE: 76 MMHG | HEART RATE: 71 BPM

## 2021-02-10 VITALS
RESPIRATION RATE: 18 BRPM | DIASTOLIC BLOOD PRESSURE: 59 MMHG | HEART RATE: 58 BPM | OXYGEN SATURATION: 100 % | TEMPERATURE: 98 F | SYSTOLIC BLOOD PRESSURE: 101 MMHG

## 2021-02-10 DIAGNOSIS — Z98.890 OTHER SPECIFIED POSTPROCEDURAL STATES: Chronic | ICD-10-CM

## 2021-02-10 DIAGNOSIS — Z98.891 HISTORY OF UTERINE SCAR FROM PREVIOUS SURGERY: Chronic | ICD-10-CM

## 2021-02-10 DIAGNOSIS — Z90.89 ACQUIRED ABSENCE OF OTHER ORGANS: Chronic | ICD-10-CM

## 2021-02-10 LAB
ALBUMIN SERPL ELPH-MCNC: 3.6 G/DL — SIGNIFICANT CHANGE UP (ref 3.3–5)
ALP SERPL-CCNC: 50 U/L — SIGNIFICANT CHANGE UP (ref 40–120)
ALT FLD-CCNC: 12 U/L — SIGNIFICANT CHANGE UP (ref 10–45)
ANION GAP SERPL CALC-SCNC: 11 MMOL/L — SIGNIFICANT CHANGE UP (ref 5–17)
AST SERPL-CCNC: 42 U/L — HIGH (ref 10–40)
BILIRUB SERPL-MCNC: 0.4 MG/DL — SIGNIFICANT CHANGE UP (ref 0.2–1.2)
BUN SERPL-MCNC: 11 MG/DL — SIGNIFICANT CHANGE UP (ref 7–23)
CALCIUM SERPL-MCNC: 9.1 MG/DL — SIGNIFICANT CHANGE UP (ref 8.4–10.5)
CHLORIDE SERPL-SCNC: 105 MMOL/L — SIGNIFICANT CHANGE UP (ref 96–108)
CO2 SERPL-SCNC: 19 MMOL/L — LOW (ref 22–31)
CREAT SERPL-MCNC: 0.68 MG/DL — SIGNIFICANT CHANGE UP (ref 0.5–1.3)
GLUCOSE SERPL-MCNC: 88 MG/DL — SIGNIFICANT CHANGE UP (ref 70–99)
HCT VFR BLD CALC: 37.5 % — SIGNIFICANT CHANGE UP (ref 34.5–45)
HGB BLD-MCNC: 12.9 G/DL — SIGNIFICANT CHANGE UP (ref 11.5–15.5)
MCHC RBC-ENTMCNC: 34.4 GM/DL — SIGNIFICANT CHANGE UP (ref 32–36)
MCHC RBC-ENTMCNC: 34.6 PG — HIGH (ref 27–34)
MCV RBC AUTO: 100.5 FL — HIGH (ref 80–100)
PLATELET # BLD AUTO: 184 K/UL — SIGNIFICANT CHANGE UP (ref 150–400)
POTASSIUM SERPL-MCNC: 5.2 MMOL/L — SIGNIFICANT CHANGE UP (ref 3.5–5.3)
POTASSIUM SERPL-SCNC: 5.2 MMOL/L — SIGNIFICANT CHANGE UP (ref 3.5–5.3)
PROT SERPL-MCNC: 6.6 G/DL — SIGNIFICANT CHANGE UP (ref 6–8.3)
RBC # BLD: 3.73 M/UL — LOW (ref 3.8–5.2)
RBC # FLD: 13.6 % — SIGNIFICANT CHANGE UP (ref 10.3–14.5)
SODIUM SERPL-SCNC: 135 MMOL/L — SIGNIFICANT CHANGE UP (ref 135–145)
TROPONIN T, HIGH SENSITIVITY RESULT: <6 NG/L — SIGNIFICANT CHANGE UP (ref 0–51)
WBC # BLD: 3.08 K/UL — LOW (ref 3.8–10.5)
WBC # FLD AUTO: 3.08 K/UL — LOW (ref 3.8–10.5)

## 2021-02-10 PROCEDURE — 70498 CT ANGIOGRAPHY NECK: CPT | Mod: 26,MG

## 2021-02-10 PROCEDURE — 85025 COMPLETE CBC W/AUTO DIFF WBC: CPT

## 2021-02-10 PROCEDURE — 72125 CT NECK SPINE W/O DYE: CPT | Mod: 26,MG

## 2021-02-10 PROCEDURE — 72170 X-RAY EXAM OF PELVIS: CPT

## 2021-02-10 PROCEDURE — 99284 EMERGENCY DEPT VISIT MOD MDM: CPT | Mod: GC

## 2021-02-10 PROCEDURE — G1004: CPT

## 2021-02-10 PROCEDURE — 70498 CT ANGIOGRAPHY NECK: CPT

## 2021-02-10 PROCEDURE — 73502 X-RAY EXAM HIP UNI 2-3 VIEWS: CPT

## 2021-02-10 PROCEDURE — 99284 EMERGENCY DEPT VISIT MOD MDM: CPT

## 2021-02-10 PROCEDURE — 80053 COMPREHEN METABOLIC PANEL: CPT

## 2021-02-10 PROCEDURE — 93005 ELECTROCARDIOGRAM TRACING: CPT

## 2021-02-10 PROCEDURE — 84484 ASSAY OF TROPONIN QUANT: CPT

## 2021-02-10 PROCEDURE — 70450 CT HEAD/BRAIN W/O DYE: CPT

## 2021-02-10 PROCEDURE — 73502 X-RAY EXAM HIP UNI 2-3 VIEWS: CPT | Mod: 26,LT

## 2021-02-10 PROCEDURE — 70496 CT ANGIOGRAPHY HEAD: CPT | Mod: 26,MG

## 2021-02-10 PROCEDURE — 70496 CT ANGIOGRAPHY HEAD: CPT

## 2021-02-10 PROCEDURE — 72125 CT NECK SPINE W/O DYE: CPT

## 2021-02-10 RX ORDER — ACETAMINOPHEN 500 MG
975 TABLET ORAL ONCE
Refills: 0 | Status: COMPLETED | OUTPATIENT
Start: 2021-02-10 | End: 2021-02-10

## 2021-02-10 RX ORDER — MECLIZINE HCL 12.5 MG
12.5 TABLET ORAL ONCE
Refills: 0 | Status: COMPLETED | OUTPATIENT
Start: 2021-02-10 | End: 2021-02-10

## 2021-02-10 RX ADMIN — Medication 975 MILLIGRAM(S): at 13:09

## 2021-02-10 RX ADMIN — Medication 12.5 MILLIGRAM(S): at 13:09

## 2021-02-10 NOTE — ED ADULT NURSE NOTE - NSIMPLEMENTINTERV_GEN_ALL_ED
Implemented All Fall Risk Interventions:  [place] to call system. Call bell, personal items and telephone within reach. Instruct patient to call for assistance. Room bathroom lighting operational. Non-slip footwear when patient is off stretcher. Physically safe environment: no spills, clutter or unnecessary equipment. Stretcher in lowest position, wheels locked, appropriate side rails in place. Provide visual cue, wrist band, yellow gown, etc. Monitor gait and stability. Monitor for mental status changes and reorient to person, place, and time. Review medications for side effects contributing to fall risk. Reinforce activity limits and safety measures with patient and family.

## 2021-02-10 NOTE — ED PROVIDER NOTE - PHYSICAL EXAMINATION
· Physical Examination: PHYSICAL EXAM:   · CONSTITUTIONAL:  Appearance: well appearing.  Development: well developed.  Distress: no apparent  · Manner: appropriate for situation.  Mentation: awake, alert, oriented to person, place, time/situation  · Mood: appropriate.  Nourishment: well  · Head Shape: normal cephalic, ATRAUMATIC  · EYES: bilateral normal, no discharge, redness or evidence of any abnormality  · Nose: clear Mouth: normal mucosa  · Throat: uvula midline, no vesicles, no redness, and no oropharyngeal exudate.  · CARDIAC:  CARDIAC RHYTHM: regular  CARDIAC RATE: normal  CARDIAC PEDAL EDEMA: absent  CARDIAC JVD: non-distended bilaterally  · CARDIAC PULSES: normal bilaterally  · RESPIRATORY:  Respiratory Distress: no  Breath Sounds: normal  · Chest Exam: normal, non-tender  · Abdominal Exam: soft, nondistended, nontender  · MUSCULOSKELETAL: C spine full ROM without point tenderness or paraspinal tenderness presen.  Spine appears normal. LUE: no ttp over left wrist. No snuff box tenderness. good distal pulse. sensation intact. LLE: Full ROM of left hip with no ttp over hip. good distal pulse. sensation intact. Pelvis stable.   · NEUROLOGICAL: Alert and oriented, no focal deficits, no motor or sensory deficits. CN 2-12 intact. 5/5 muscle strength in upper and lower extremities b/l. Sensation intact in upper and lower extremities b/l. Finger to nose nl b/l.   · SKIN: Skin normal color for race, warm, dry and intact. No evidence of rash.  · PSYCHIATRIC: Alert and oriented to person, place, time/situation. normal mood and affect. no apparent risk to self or others.

## 2021-02-10 NOTE — ED PROVIDER NOTE - SHIFT CHANGE DETAILS
Attending MD Kaba: 75F no PMH mechanical fall yesterday, slipped on ice, unclear if head trauma, now with L buttock pain, dizziness, lightheadedness, DDx includes concussion, pending CTH/CT Csp/CTAHN, VITALS, XR Attending MD Kaba: 75F no PMH mechanical fall yesterday, slipped on ice, unclear if head trauma, now with L buttock pain, dizziness, lightheadedness, DDx includes concussion, pending CTH/CT Csp/CTAHN, VITALS, XR, if CTs unremarkable, can be discharged for outpatient follow up

## 2021-02-10 NOTE — ED PROVIDER NOTE - PROGRESS NOTE DETAILS
Attending MD Kaba: Patient re-evaluated and feeling improved.  No acute issues at  this time.  Lab and radiology tests reviewed with patient including WBC, reports that this is chronic and baseline for her is 2s.  Reports has had extensive work up with no clear etiology.  Patient stable for discharge. Follow up instructions given, importance of follow up emphasized, return to ED parameters reviewed and patient verbalized understanding.  All questions answered, all concerns addressed.

## 2021-02-10 NOTE — ED PROVIDER NOTE - PATIENT PORTAL LINK FT
You can access the FollowMyHealth Patient Portal offered by [facility] by registering at the following website: [email or web address]. By joining [facility]’s FollowMyHealth portal, you will also be able to view your health information using other applications (apps) compatible with our system.

## 2021-02-10 NOTE — ED PROVIDER NOTE - NSFOLLOWUPCLINICS_GEN_ALL_ED_FT
[facility]  Neurology  [address] [place] [address] [address] [place] [address]  Phone: [phone]  Fax:   Follow Up Time: 1-3 Days

## 2021-02-10 NOTE — ED ADULT NURSE NOTE - OBJECTIVE STATEMENT
75yr old female patient with no PMH  coming in after falling on black ice yesterday and waking up this AM with a headache, left sided head pain, left eye blurriness and vertigo. Pt states that when she tried to get out of be this AM she was very dizzy and felt as if she was going to fall. Pt denies any LOC to fall yesterday. She states that she did not take any pain medication for headache. She states that when she fell yesterday she hit her left hip and left wrist trying to break her fall. There is no ecchymosis or open wounds noted to skin. Neuro checks are intact. All four extremities are strong. Pt denies any SOB, fever, chills, n/v/d, or CP at current time. PT A&Ox4. VSS. Pts skin is intact and normal for race. +2 palpable peripheral pulses. Cap refill <2 seconds.  Safety maintained. Call bell at bedside. PO Tylenol and meclizine given for headache and vertigo. Will continue to monitor.

## 2021-02-10 NOTE — ED PROVIDER NOTE - CARE PLAN
Principal Discharge DX:	Fall, initial encounter   Principal Discharge DX:	Fall, initial encounter  Secondary Diagnosis:	Closed head injury

## 2021-02-10 NOTE — ED PROVIDER NOTE - CLINICAL SUMMARY MEDICAL DECISION MAKING FREE TEXT BOX
Closed head injury after mechanical fall.  Age > 65 otherwise no risk factors for sah/sdh/etc given normal neuro exam including motor, sensory, and gait assessments WNL.  Not on AC.  Nontoxic appearing.  Slip and fall.  States "in a fog".  Likely concussion but will require CT to eval for emergent intracranial hemorrhage.  Labs to ensure no electrolyte imbalance.  Likely d/c.  --BMM

## 2021-02-10 NOTE — ED PROVIDER NOTE - OBJECTIVE STATEMENT
75 F with no hx presenting for occipital throbbing headache x 1 day. Symptoms started suddenly s/p mechanical trip and fall on ice yesterday afternoon, have been constant and worsening, with no worsening or alleviating factors. No medications taken. Patient notes associated near syncope like episodes since yesterdays fall. Patient denies chest pain, sob, fever, chills,  nausea, emesis, diarrhea, abdominal pain, hematuria/dysuria or lower extremity swelling. Not worst headache or thunder clap headache. no blood thinner use. Notes she fell and hit her left hip and left wrist but was able to get back up and walk. No LOC.

## 2021-02-10 NOTE — ED PROVIDER NOTE - NSDCPRINTRESULTS_ED_ALL_ED
Pt issued script for upcoming dental visits per Dr Mcdowell' joint replacement protocol.  Replied back to pt using icomasoft. All questions appear to be addressed at this time.     Patient requests all Lab and Radiology Results on their Discharge Instructions

## 2021-02-10 NOTE — ED PROVIDER NOTE - NSFOLLOWUPINSTRUCTIONS_ED_ALL_ED_FT
You were evaluated in the Emergency Department for head injury.  You were evaluated and examined by a physician, and you had labs, XR, and CT scan.     Based on your evaluation: concussion    There are no signs of emergency conditions requiring admission to the hospital on today's workup.  Based on the evaluation, a presumptive diagnosis was made, however, further evaluation may be required by your primary care physician or a specialist for a more definitive diagnosis.  Therefore, please follow-up as directed or return to the Emergency Department if your symptoms change or worsen.    We recommend that you:  1. See your primary care physician within the next 72 hours for follow up.  Bring a copy of your discharge paperwork (including any test results) to your doctor.  2. Please see neurology listed bellow for follow up of concussion like symptoms you are experience within 72 hours.   3. Motrin/Tylenol as needed for pain        *** Return immediately if you have worsening pain or symptoms or any other new/concerning symptoms. ***

## 2021-02-10 NOTE — ED ADULT NURSE NOTE - CHPI ED NUR SYMPTOMS POS
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 Patient noted her left 2nd finger to be black, discolored, causing her excruciating soreness and pain. Pain characterized 9/10, non radiating, slightly improved with Advil with no motor or sensation deficits. Patient says she takes 6 Advil tablets/ day since november. Patient reports last BM was 2 days ago with bright red blood in her stool, unclear duration of bloody stools and she states she had a colonoscopy " a few months ago", though history is unreliable. Patient also complains of painful ulcers on her buttocks, which has been bleeding for 6 weeks. Unclear duration of ulcers or current ulcer management. Per medical record/ aid, patient has lost > 40 lbs in the last few months. ROS negative for headaches, fevers, blurry vision, chest pain, or abdominal pain. Patient states she has a home health aid that comes daily. She ambulates with a walker.     In the ED, VS: temp: 97.2, HR: 87, BP: 129/61, RR: 17, O2: 100% on room air. Labs significant for Hb 7.3, platelet count: 63, Na, 153, Carbon Dioxide: 18, BUN: 38, Albumin: 2.0. Patient given 2L IVF bolus, s/p Vancomycin + Zosyn. Vascular consulted, recommended to defer to hand surgery. Per vascular, left 2nd digit amputation scheduled for March 2017. Hand surgery consulted.     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 Patient noted her left 2nd finger to be black, discolored, causing her excruciating soreness and pain. Pain characterized 9/10, non radiating, slightly improved with Advil with no motor or sensation deficits. Patient says she takes 6 Advil tablets/ day since november. Patient reports last BM was 2 days ago with bright red blood in her stool, unclear duration of bloody stools and she states she had a colonoscopy " a few months ago", though history is unreliable. Patient also complains of painful ulcers on her buttocks, which has been bleeding for 6 weeks. Unclear duration of ulcers or current ulcer management. Per medical record/ aid, patient has lost > 40 lbs in the last few months. ROS negative for headaches, fevers, blurry vision, chest pain, or abdominal pain. Patient states she has a home health aid that comes daily. She ambulates with a walker.     In the ED, VS: temp: 97.2, HR: 87, BP: 129/61, RR: 17, O2: 100% on room air. Labs significant for Hb 7.3, platelet count: 63, Na, 153, Carbon Dioxide: 18, BUN: 38, Albumin: 2.0. Patient given 2L IVF bolus, s/p Vancomycin + Zosyn. Vascular consulted, recommended to defer to hand surgery. Per vascular, left 2nd digit amputation scheduled for March 2017. Hand surgery consulted.     Patient will be admitted to [facility]

## 2021-05-12 ENCOUNTER — RX RENEWAL (OUTPATIENT)
Age: 76
End: 2021-05-12

## 2021-07-02 ENCOUNTER — APPOINTMENT (OUTPATIENT)
Dept: INTERNAL MEDICINE | Facility: CLINIC | Age: 76
End: 2021-07-02
Payer: MEDICARE

## 2021-07-02 ENCOUNTER — RESULT REVIEW (OUTPATIENT)
Age: 76
End: 2021-07-02

## 2021-07-02 VITALS
HEIGHT: 64 IN | HEART RATE: 73 BPM | OXYGEN SATURATION: 97 % | TEMPERATURE: 97.7 F | BODY MASS INDEX: 22.53 KG/M2 | WEIGHT: 132 LBS | RESPIRATION RATE: 16 BRPM

## 2021-07-02 DIAGNOSIS — D72.819 DECREASED WHITE BLOOD CELL COUNT, UNSPECIFIED: ICD-10-CM

## 2021-07-02 PROCEDURE — G0442 ANNUAL ALCOHOL SCREEN 15 MIN: CPT | Mod: 59

## 2021-07-02 PROCEDURE — G0439: CPT

## 2021-07-02 RX ORDER — OLOPATADINE HCL 1 MG/ML
0.1 SOLUTION/ DROPS OPHTHALMIC TWICE DAILY
Qty: 1 | Refills: 0 | Status: DISCONTINUED | COMMUNITY
Start: 2018-03-05 | End: 2021-07-02

## 2021-07-02 RX ORDER — ACETAMINOPHEN 325 MG/1
325 TABLET ORAL
Refills: 0 | Status: DISCONTINUED | COMMUNITY
End: 2021-07-02

## 2021-07-02 NOTE — HISTORY OF PRESENT ILLNESS
[FreeTextEntry1] : New patient [de-identified] : Moved from [facility] by [facility]\par \par Recent slip on ice and persistent dizziness\par Was at hos;pital   in \par \par Hx HLD and leukopenia BMBx neg\par \par Hx Left THR anterior at [facility]\par \par Brother  this past year\par Son has a rare disease Suscex autoimmune inflammation of brain 45 yo \par Dtr also lives local \par \par Twisted ankle recently

## 2021-07-02 NOTE — HEALTH RISK ASSESSMENT
[Very Good] : ~his/her~ current health as very good [Good] : ~his/her~  mood as  good [] : No [Yes] : Yes [Monthly or less (1 pt)] : Monthly or less (1 point) [1 or 2 (0 pts)] : 1 or 2 (0 points) [Never (0 pts)] : Never (0 points) [No falls in past year] : Patient reported no falls in the past year [de-identified] : yoga swims [de-identified] : good

## 2021-07-07 ENCOUNTER — OUTPATIENT (OUTPATIENT)
Dept: OUTPATIENT SERVICES | Facility: HOSPITAL | Age: 76
LOS: 1 days | End: 2021-07-07
Payer: MEDICARE

## 2021-07-07 ENCOUNTER — APPOINTMENT (OUTPATIENT)
Dept: RADIOLOGY | Facility: HOSPITAL | Age: 76
End: 2021-07-07
Payer: MEDICARE

## 2021-07-07 DIAGNOSIS — Z98.890 OTHER SPECIFIED POSTPROCEDURAL STATES: Chronic | ICD-10-CM

## 2021-07-07 DIAGNOSIS — Z00.8 ENCOUNTER FOR OTHER GENERAL EXAMINATION: ICD-10-CM

## 2021-07-07 DIAGNOSIS — Z90.89 ACQUIRED ABSENCE OF OTHER ORGANS: Chronic | ICD-10-CM

## 2021-07-07 DIAGNOSIS — Z98.891 HISTORY OF UTERINE SCAR FROM PREVIOUS SURGERY: Chronic | ICD-10-CM

## 2021-07-07 PROCEDURE — 77080 DXA BONE DENSITY AXIAL: CPT

## 2021-07-07 PROCEDURE — 77080 DXA BONE DENSITY AXIAL: CPT | Mod: 26

## 2021-07-08 ENCOUNTER — TRANSCRIPTION ENCOUNTER (OUTPATIENT)
Age: 76
End: 2021-07-08

## 2021-07-12 ENCOUNTER — APPOINTMENT (OUTPATIENT)
Dept: OBGYN | Facility: CLINIC | Age: 76
End: 2021-07-12
Payer: MEDICARE

## 2021-07-12 VITALS
DIASTOLIC BLOOD PRESSURE: 59 MMHG | SYSTOLIC BLOOD PRESSURE: 100 MMHG | BODY MASS INDEX: 21.66 KG/M2 | WEIGHT: 130 LBS | HEART RATE: 69 BPM | TEMPERATURE: 97 F | RESPIRATION RATE: 12 BRPM | OXYGEN SATURATION: 97 % | HEIGHT: 65 IN

## 2021-07-12 DIAGNOSIS — N36.2 URETHRAL CARUNCLE: ICD-10-CM

## 2021-07-12 PROCEDURE — G0101: CPT

## 2021-07-12 NOTE — COUNSELING
[Nutrition/ Exercise/ Weight Management] : nutrition, exercise, weight management [Breast Self Exam] : breast self exam [Bladder Hygiene] : bladder hygiene [Confidentiality] : confidentiality [STD (testing, results, tx)] : STD (testing, results, tx) [Vaccines] : vaccines

## 2021-07-12 NOTE — HISTORY OF PRESENT ILLNESS
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

## 2021-07-12 NOTE — PHYSICAL EXAM
[Appropriately responsive] : appropriately responsive [Alert] : alert [Soft] : soft [Non-tender] : non-tender [Non-distended] : non-distended [No Lesions] : no lesions [Oriented x3] : oriented x3 [Examination Of The Breasts] : a normal appearance [No Masses] : no breast masses were palpable [Labia Majora] : normal [Labia Minora] : normal [Urethral Caruncle] : urethral caruncle [Normal] : normal [Uterine Adnexae] : normal [FreeTextEntry3] : non-bloody, subcentimeter in size

## 2021-07-12 NOTE — PLAN
[FreeTextEntry1] : Health Care Maintenance\par - Pap completed--patient with normal paps in the past, and no new sexual partners--pap graduate after this one if results are normal\par - Mammo completed\par - Dexa scan completed\par - Colonoscopy scheduled per pt\par - HPV Vaccine ineligible\par - STI screening offered--declined\par \par Urethral caruncle\par - pt asymptomatic, never with hematuria or PMB\par - would refer to urogyn to rule out pathology in bladder\par - Dr. Toscano referral given\par \par Return to care -----PRN\par \par I spent the time noted on the day of this patient encounter preparing for, providing and documenting the above E/M service and counseling and educate patient on differential, workup, disease course, and treatment/management. Education was provided to the patient during this encounter. All questions and concerns were answered and addressed in detail.\par \par Brynn Ramesh MD\par \par

## 2021-07-13 ENCOUNTER — FORM ENCOUNTER (OUTPATIENT)
Age: 76
End: 2021-07-13

## 2021-07-13 ENCOUNTER — APPOINTMENT (OUTPATIENT)
Dept: ULTRASOUND IMAGING | Facility: HOSPITAL | Age: 76
End: 2021-07-13
Payer: MEDICARE

## 2021-07-13 ENCOUNTER — RESULT REVIEW (OUTPATIENT)
Age: 76
End: 2021-07-13

## 2021-07-13 ENCOUNTER — OUTPATIENT (OUTPATIENT)
Dept: OUTPATIENT SERVICES | Facility: HOSPITAL | Age: 76
LOS: 1 days | End: 2021-07-13
Payer: MEDICARE

## 2021-07-13 ENCOUNTER — APPOINTMENT (OUTPATIENT)
Dept: MAMMOGRAPHY | Facility: HOSPITAL | Age: 76
End: 2021-07-13
Payer: MEDICARE

## 2021-07-13 DIAGNOSIS — Z90.89 ACQUIRED ABSENCE OF OTHER ORGANS: Chronic | ICD-10-CM

## 2021-07-13 DIAGNOSIS — Z98.891 HISTORY OF UTERINE SCAR FROM PREVIOUS SURGERY: Chronic | ICD-10-CM

## 2021-07-13 DIAGNOSIS — Z00.8 ENCOUNTER FOR OTHER GENERAL EXAMINATION: ICD-10-CM

## 2021-07-13 DIAGNOSIS — Z98.890 OTHER SPECIFIED POSTPROCEDURAL STATES: Chronic | ICD-10-CM

## 2021-07-13 PROCEDURE — 77065 DX MAMMO INCL CAD UNI: CPT | Mod: 26,GG,LT

## 2021-07-13 PROCEDURE — 76641 ULTRASOUND BREAST COMPLETE: CPT

## 2021-07-13 PROCEDURE — 77067 SCR MAMMO BI INCL CAD: CPT | Mod: 26,59

## 2021-07-13 PROCEDURE — 77067 SCR MAMMO BI INCL CAD: CPT

## 2021-07-13 PROCEDURE — 77063 BREAST TOMOSYNTHESIS BI: CPT

## 2021-07-13 PROCEDURE — 77065 DX MAMMO INCL CAD UNI: CPT

## 2021-07-13 PROCEDURE — 76641 ULTRASOUND BREAST COMPLETE: CPT | Mod: 26,50

## 2021-07-13 PROCEDURE — 77063 BREAST TOMOSYNTHESIS BI: CPT | Mod: 26,59

## 2021-07-14 ENCOUNTER — TRANSCRIPTION ENCOUNTER (OUTPATIENT)
Age: 76
End: 2021-07-14

## 2021-07-14 LAB
25(OH)D3 SERPL-MCNC: 42 NG/ML
ALBUMIN SERPL ELPH-MCNC: 4.3 G/DL
ALP BLD-CCNC: 55 U/L
ALT SERPL-CCNC: 15 U/L
ANION GAP SERPL CALC-SCNC: 10 MMOL/L
APPEARANCE: CLEAR
AST SERPL-CCNC: 28 U/L
BACTERIA: NEGATIVE
BASOPHILS # BLD AUTO: 0.03 K/UL
BASOPHILS NFR BLD AUTO: 0.8 %
BILIRUB SERPL-MCNC: 0.5 MG/DL
BILIRUBIN URINE: NEGATIVE
BLOOD URINE: NEGATIVE
BUN SERPL-MCNC: 21 MG/DL
CALCIUM SERPL-MCNC: 9.5 MG/DL
CHLORIDE SERPL-SCNC: 103 MMOL/L
CHOLEST SERPL-MCNC: 236 MG/DL
CO2 SERPL-SCNC: 24 MMOL/L
COLOR: NORMAL
CREAT SERPL-MCNC: 0.82 MG/DL
EOSINOPHIL # BLD AUTO: 0.07 K/UL
EOSINOPHIL NFR BLD AUTO: 1.9 %
ESTIMATED AVERAGE GLUCOSE: 108 MG/DL
GLUCOSE QUALITATIVE U: NEGATIVE
GLUCOSE SERPL-MCNC: 117 MG/DL
HBA1C MFR BLD HPLC: 5.4 %
HCT VFR BLD CALC: 35.4 %
HDLC SERPL-MCNC: 67 MG/DL
HGB BLD-MCNC: 12.1 G/DL
HPV HIGH+LOW RISK DNA PNL CVX: NOT DETECTED
HYALINE CASTS: 0 /LPF
IMM GRANULOCYTES NFR BLD AUTO: 0.3 %
KETONES URINE: NEGATIVE
LDLC SERPL CALC-MCNC: 149 MG/DL
LEUKOCYTE ESTERASE URINE: NEGATIVE
LYMPHOCYTES # BLD AUTO: 1 K/UL
LYMPHOCYTES NFR BLD AUTO: 27.8 %
MAN DIFF?: NORMAL
MCHC RBC-ENTMCNC: 34.2 GM/DL
MCHC RBC-ENTMCNC: 35.2 PG
MCV RBC AUTO: 102.9 FL
MICROSCOPIC-UA: NORMAL
MONOCYTES # BLD AUTO: 0.33 K/UL
MONOCYTES NFR BLD AUTO: 9.2 %
NEUTROPHILS # BLD AUTO: 2.16 K/UL
NEUTROPHILS NFR BLD AUTO: 60 %
NITRITE URINE: NEGATIVE
NONHDLC SERPL-MCNC: 169 MG/DL
PH URINE: 6.5
PLATELET # BLD AUTO: 163 K/UL
POTASSIUM SERPL-SCNC: 5.2 MMOL/L
PROT SERPL-MCNC: 6.8 G/DL
PROTEIN URINE: NEGATIVE
RBC # BLD: 3.44 M/UL
RBC # FLD: 13.8 %
RED BLOOD CELLS URINE: 1 /HPF
SODIUM SERPL-SCNC: 138 MMOL/L
SPECIFIC GRAVITY URINE: 1.02
SQUAMOUS EPITHELIAL CELLS: 0 /HPF
TRIGL SERPL-MCNC: 98 MG/DL
TSH SERPL-ACNC: 0.92 UIU/ML
UROBILINOGEN URINE: NORMAL
WBC # FLD AUTO: 3.6 K/UL
WHITE BLOOD CELLS URINE: 1 /HPF

## 2021-07-15 ENCOUNTER — TRANSCRIPTION ENCOUNTER (OUTPATIENT)
Age: 76
End: 2021-07-15

## 2021-07-15 LAB — CYTOLOGY CVX/VAG DOC THIN PREP: ABNORMAL

## 2021-07-21 ENCOUNTER — RX RENEWAL (OUTPATIENT)
Age: 76
End: 2021-07-21

## 2021-07-21 ENCOUNTER — TRANSCRIPTION ENCOUNTER (OUTPATIENT)
Age: 76
End: 2021-07-21

## 2021-09-29 ENCOUNTER — APPOINTMENT (OUTPATIENT)
Dept: OBGYN | Facility: CLINIC | Age: 76
End: 2021-09-29

## 2022-01-01 NOTE — ED PROVIDER NOTE - NS ED NOTE AC HIGH RISK COUNTRIES
Patient presents for visit accompanied by parents  CC: follow up jaundice  HPI: Cadence is here for recheck of hyperbilirubinemia and weight check  Last check 10/15 at 18.1 and still high intermediate range  She is nursing well every 1.5-2 hours  She has gained about 4 oz in the last few days   Having yellow seedy stools  . No cough, congestion, or runny nose. Denies ear pain, or sore throat. No vomiting, or diarrhea.    ALL:Reviewed and or Reconciled.  MEDS:Reviewed and or Reconciled.  IMM:UTD  PMH:problem list reviewed    ROS:   CONSTITUTIONAL:alert, interactive   EYES:no eye discharge   ENT:no URI sx   RESP:nl breathing, no wheezing or shortness of breath   GI: no vomiting or diarrhea   SKIN:no rash    PHYS. EXAM:vital signs have been reviewed(see nurses notes)   GEN:well nourished, well developed. Pain 0/10   SKIN:normal skin turgor, no lesions    EYES:PERRLA, nl conjuctiva   EARS:nl pinnae, TM's intact, right TM nl, left TM nl   NASAL:mucosa pink, no congestion, no discharge   MOUTH: mucus membranes moist, no pharyngeal erythema   NECK:supple, no masses   RESP:nl resp. effort, clear to auscultation   HEART:RRR, nl s1s2, no murmur or edema   ABD: positive BS, soft, NT,ND,no HSM   MS:nl tone and motor movement of extremities   LYMPH:no cervical nodes   PSYCH:in no acute distress, appropriate and interactive     IMP: Cadence was seen today for follow-up.    Diagnoses and all orders for this visit:    Jaundice  -     Bilirubin, Total; Future  -     Bilirubin, Direct; Future    Gaining weight well  Continue breastfeeding hours   No

## 2022-01-17 NOTE — PATIENT PROFILE ADULT - BRADEN ACTIVITY
MD Vin Jaquez T  Nurse Msg Pool 1 hour ago (12:38 PM)         I hank she was asked to come back in for BP check in a month    Message text      Patient has appt for 2/7. B/p will be addressed at that visit.   (3) walks occasionally

## 2022-01-18 ENCOUNTER — OUTPATIENT (OUTPATIENT)
Dept: OUTPATIENT SERVICES | Facility: HOSPITAL | Age: 77
LOS: 1 days | End: 2022-01-18
Payer: MEDICARE

## 2022-01-18 ENCOUNTER — APPOINTMENT (OUTPATIENT)
Dept: MAMMOGRAPHY | Facility: CLINIC | Age: 77
End: 2022-01-18
Payer: MEDICARE

## 2022-01-18 ENCOUNTER — RESULT REVIEW (OUTPATIENT)
Age: 77
End: 2022-01-18

## 2022-01-18 DIAGNOSIS — R92.8 OTHER ABNORMAL AND INCONCLUSIVE FINDINGS ON DIAGNOSTIC IMAGING OF BREAST: ICD-10-CM

## 2022-01-18 DIAGNOSIS — Z98.890 OTHER SPECIFIED POSTPROCEDURAL STATES: Chronic | ICD-10-CM

## 2022-01-18 DIAGNOSIS — Z90.89 ACQUIRED ABSENCE OF OTHER ORGANS: Chronic | ICD-10-CM

## 2022-01-18 DIAGNOSIS — Z98.891 HISTORY OF UTERINE SCAR FROM PREVIOUS SURGERY: Chronic | ICD-10-CM

## 2022-01-18 PROCEDURE — 77065 DX MAMMO INCL CAD UNI: CPT

## 2022-01-18 PROCEDURE — G0279: CPT | Mod: 26

## 2022-01-18 PROCEDURE — 77065 DX MAMMO INCL CAD UNI: CPT | Mod: 26,LT

## 2022-01-18 PROCEDURE — G0279: CPT

## 2022-02-25 ENCOUNTER — APPOINTMENT (OUTPATIENT)
Dept: MAMMOGRAPHY | Facility: HOSPITAL | Age: 77
End: 2022-02-25

## 2022-03-24 ENCOUNTER — NON-APPOINTMENT (OUTPATIENT)
Age: 77
End: 2022-03-24

## 2022-03-24 ENCOUNTER — APPOINTMENT (OUTPATIENT)
Dept: FAMILY MEDICINE | Facility: CLINIC | Age: 77
End: 2022-03-24
Payer: MEDICARE

## 2022-03-24 VITALS
TEMPERATURE: 97 F | BODY MASS INDEX: 21.66 KG/M2 | HEIGHT: 65 IN | SYSTOLIC BLOOD PRESSURE: 104 MMHG | RESPIRATION RATE: 14 BRPM | WEIGHT: 130 LBS | OXYGEN SATURATION: 97 % | DIASTOLIC BLOOD PRESSURE: 60 MMHG | HEART RATE: 67 BPM

## 2022-03-24 DIAGNOSIS — E87.5 HYPERKALEMIA: ICD-10-CM
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
## 2022-03-24 LAB
ALBUMIN SERPL ELPH-MCNC: 4.3 G/DL
ALP BLD-CCNC: 57 U/L
ALT SERPL-CCNC: 11 U/L
ANION GAP SERPL CALC-SCNC: 10 MMOL/L
AST SERPL-CCNC: 21 U/L
BASOPHILS # BLD AUTO: 0.02 K/UL
BASOPHILS NFR BLD AUTO: 0.6 %
BILIRUB SERPL-MCNC: 0.6 MG/DL
BUN SERPL-MCNC: 18 MG/DL
CALCIUM SERPL-MCNC: 9.9 MG/DL
CHLORIDE SERPL-SCNC: 106 MMOL/L
CO2 SERPL-SCNC: 24 MMOL/L
CREAT SERPL-MCNC: 0.85 MG/DL
EGFR: 70 ML/MIN/1.73M2
EOSINOPHIL # BLD AUTO: 0.07 K/UL
EOSINOPHIL NFR BLD AUTO: 2.1 %
GLUCOSE SERPL-MCNC: 82 MG/DL
HCT VFR BLD CALC: 38.9 %
HGB BLD-MCNC: 13.3 G/DL
IMM GRANULOCYTES NFR BLD AUTO: 0 %
LYMPHOCYTES # BLD AUTO: 0.96 K/UL
LYMPHOCYTES NFR BLD AUTO: 29.3 %
MAN DIFF?: NORMAL
MCHC RBC-ENTMCNC: 34.2 GM/DL
MCHC RBC-ENTMCNC: 34.5 PG
MCV RBC AUTO: 100.8 FL
MONOCYTES # BLD AUTO: 0.4 K/UL
MONOCYTES NFR BLD AUTO: 12.2 %
NEUTROPHILS # BLD AUTO: 1.83 K/UL
NEUTROPHILS NFR BLD AUTO: 55.8 %
PLATELET # BLD AUTO: 201 K/UL
POTASSIUM SERPL-SCNC: 5.9 MMOL/L
PROT SERPL-MCNC: 6.9 G/DL
RBC # BLD: 3.86 M/UL
RBC # FLD: 13.9 %
SODIUM SERPL-SCNC: 140 MMOL/L
WBC # FLD AUTO: 3.28 K/UL

## 2022-03-24 PROCEDURE — 93000 ELECTROCARDIOGRAM COMPLETE: CPT | Mod: 59

## 2022-03-24 PROCEDURE — 99213 OFFICE O/P EST LOW 20 MIN: CPT | Mod: 25

## 2022-03-24 NOTE — END OF VISIT
[Time Spent: ___ minutes] : I have spent [unfilled] minutes of time on the encounter.
no loss of consciousness, no gait abnormality, no headache, no sensory deficits, and no weakness.

## 2022-03-24 NOTE — HISTORY OF PRESENT ILLNESS
[No Pertinent Cardiac History] : no history of aortic stenosis, atrial fibrillation, coronary artery disease, recent myocardial infarction, or implantable device/pacemaker [No Pertinent Pulmonary History] : no history of asthma, COPD, sleep apnea, or smoking [No Adverse Anesthesia Reaction] : no adverse anesthesia reaction in self or family member [Asthma] : no asthma [COPD] : no COPD [Sleep Apnea] : no sleep apnea [Smoker] : not a smoker [Chronic Anticoagulation] : no chronic anticoagulation [Chronic Kidney Disease] : no chronic kidney disease [Diabetes] : no diabetes [(Patient denies any chest pain, claudication, dyspnea on exertion, orthopnea, palpitations or syncope)] : Patient denies any chest pain, claudication, dyspnea on exertion, orthopnea, palpitations or syncope [FreeTextEntry1] : R eye cataract surgery  [FreeTextEntry2] :  4/20/22  [FreeTextEntry3] : Dr. Mcdaniels  [FreeTextEntry4] : 78 yo female PMH anxiety/depression, HLD presenting today for medical optimization for cataract surgery scheduled for 4/20/22. Denies any complaints today.

## 2022-03-24 NOTE — ASSESSMENT
[FreeTextEntry4] : 78 yo female PMH anxiety/depression, HLD presenting today for medical optimization for cataract surgery.

## 2022-03-24 NOTE — PLAN
[FreeTextEntry1] : -Patient is medically optimized for planned procedure pending review of lab work.  \par -EKG NST similar to previous EKG

## 2022-04-26 ENCOUNTER — APPOINTMENT (OUTPATIENT)
Dept: INTERNAL MEDICINE | Facility: CLINIC | Age: 77
End: 2022-04-26
Payer: MEDICARE

## 2022-04-26 VITALS
BODY MASS INDEX: 22.2 KG/M2 | DIASTOLIC BLOOD PRESSURE: 64 MMHG | WEIGHT: 130 LBS | HEIGHT: 64 IN | HEART RATE: 59 BPM | TEMPERATURE: 97.6 F | RESPIRATION RATE: 16 BRPM | OXYGEN SATURATION: 97 % | SYSTOLIC BLOOD PRESSURE: 100 MMHG

## 2022-04-26 DIAGNOSIS — H26.9 UNSPECIFIED CATARACT: ICD-10-CM

## 2022-04-26 PROCEDURE — 99213 OFFICE O/P EST LOW 20 MIN: CPT

## 2022-04-26 NOTE — HISTORY OF PRESENT ILLNESS
[No Pertinent Cardiac History] : no history of aortic stenosis, atrial fibrillation, coronary artery disease, recent myocardial infarction, or implantable device/pacemaker [No Pertinent Pulmonary History] : no history of asthma, COPD, sleep apnea, or smoking [(Patient denies any chest pain, claudication, dyspnea on exertion, orthopnea, palpitations or syncope)] : Patient denies any chest pain, claudication, dyspnea on exertion, orthopnea, palpitations or syncope [Good (7-10 METs)] : Good (7-10 METs) [Aortic Stenosis] : no aortic stenosis [Atrial Fibrillation] : no atrial fibrillation [Coronary Artery Disease] : no coronary artery disease [Recent Myocardial Infarction] : no recent myocardial infarction [Implantable Device/Pacemaker] : no implantable device/pacemaker [Asthma] : no asthma [COPD] : no COPD [Sleep Apnea] : no sleep apnea [Smoker] : not a smoker [Family Member] : no family member with adverse anesthesia reaction/sudden death [Self] : no previous adverse anesthesia reaction [Chronic Anticoagulation] : no chronic anticoagulation [Chronic Kidney Disease] : no chronic kidney disease [Diabetes] : no diabetes [FreeTextEntry1] : Right eye cataract [FreeTextEntry2] : 5/4/2022 [FreeTextEntry3] : Dr Mcdaniels

## 2022-04-28 ENCOUNTER — LABORATORY RESULT (OUTPATIENT)
Age: 77
End: 2022-04-28

## 2022-05-06 ENCOUNTER — TRANSCRIPTION ENCOUNTER (OUTPATIENT)
Age: 77
End: 2022-05-06

## 2022-05-06 LAB
25(OH)D3 SERPL-MCNC: 31.2 NG/ML
ALBUMIN SERPL ELPH-MCNC: 4.2 G/DL
ALP BLD-CCNC: 58 U/L
ALT SERPL-CCNC: 10 U/L
ANION GAP SERPL CALC-SCNC: 11 MMOL/L
APPEARANCE: CLEAR
AST SERPL-CCNC: 20 U/L
BACTERIA: NEGATIVE
BASOPHILS # BLD AUTO: 0.02 K/UL
BASOPHILS NFR BLD AUTO: 0.8 %
BILIRUB SERPL-MCNC: 0.4 MG/DL
BILIRUBIN URINE: NEGATIVE
BLOOD URINE: NEGATIVE
BUN SERPL-MCNC: 17 MG/DL
CALCIUM SERPL-MCNC: 9.4 MG/DL
CHLORIDE SERPL-SCNC: 102 MMOL/L
CHOLEST SERPL-MCNC: 249 MG/DL
CO2 SERPL-SCNC: 26 MMOL/L
COLOR: COLORLESS
CREAT SERPL-MCNC: 0.71 MG/DL
EGFR: 87 ML/MIN/1.73M2
EOSINOPHIL # BLD AUTO: 0.12 K/UL
EOSINOPHIL NFR BLD AUTO: 4.7 %
ESTIMATED AVERAGE GLUCOSE: 114 MG/DL
GLUCOSE QUALITATIVE U: NEGATIVE
GLUCOSE SERPL-MCNC: 89 MG/DL
HBA1C MFR BLD HPLC: 5.6 %
HCT VFR BLD CALC: 38.9 %
HDLC SERPL-MCNC: 67 MG/DL
HGB BLD-MCNC: 13.1 G/DL
HYALINE CASTS: 0 /LPF
IMM GRANULOCYTES NFR BLD AUTO: 0 %
KETONES URINE: NEGATIVE
LDLC SERPL CALC-MCNC: 165 MG/DL
LEUKOCYTE ESTERASE URINE: NEGATIVE
LYMPHOCYTES # BLD AUTO: 1.09 K/UL
LYMPHOCYTES NFR BLD AUTO: 42.6 %
MAN DIFF?: NORMAL
MCHC RBC-ENTMCNC: 33.7 GM/DL
MCHC RBC-ENTMCNC: 34.3 PG
MCV RBC AUTO: 101.8 FL
MICROSCOPIC-UA: NORMAL
MONOCYTES # BLD AUTO: 0.35 K/UL
MONOCYTES NFR BLD AUTO: 13.7 %
NEUTROPHILS # BLD AUTO: 0.98 K/UL
NEUTROPHILS NFR BLD AUTO: 38.2 %
NITRITE URINE: NEGATIVE
NONHDLC SERPL-MCNC: 182 MG/DL
PH URINE: 7
PLATELET # BLD AUTO: 157 K/UL
POTASSIUM SERPL-SCNC: 4.4 MMOL/L
PROT SERPL-MCNC: 6.7 G/DL
PROTEIN URINE: NEGATIVE
RBC # BLD: 3.82 M/UL
RBC # FLD: 13.8 %
RED BLOOD CELLS URINE: 1 /HPF
SODIUM SERPL-SCNC: 139 MMOL/L
SPECIFIC GRAVITY URINE: 1.01
SQUAMOUS EPITHELIAL CELLS: 0 /HPF
TRIGL SERPL-MCNC: 87 MG/DL
TSH SERPL-ACNC: 1.17 UIU/ML
UROBILINOGEN URINE: NORMAL
WBC # FLD AUTO: 2.56 K/UL
WHITE BLOOD CELLS URINE: 0 /HPF

## 2022-05-10 ENCOUNTER — INPATIENT (INPATIENT)
Facility: HOSPITAL | Age: 77
LOS: 1 days | Discharge: HOME | DRG: 482 | End: 2022-05-12
Attending: INTERNAL MEDICINE | Admitting: INTERNAL MEDICINE
Payer: MEDICARE

## 2022-05-10 ENCOUNTER — TRANSCRIPTION ENCOUNTER (OUTPATIENT)
Age: 77
End: 2022-05-10

## 2022-05-10 VITALS
WEIGHT: 130.07 LBS | TEMPERATURE: 98 F | OXYGEN SATURATION: 93 % | RESPIRATION RATE: 19 BRPM | HEART RATE: 75 BPM | SYSTOLIC BLOOD PRESSURE: 117 MMHG | HEIGHT: 64 IN | DIASTOLIC BLOOD PRESSURE: 60 MMHG

## 2022-05-10 DIAGNOSIS — S72.001A FRACTURE OF UNSPECIFIED PART OF NECK OF RIGHT FEMUR, INITIAL ENCOUNTER FOR CLOSED FRACTURE: ICD-10-CM

## 2022-05-10 DIAGNOSIS — Z86.69 PERSONAL HISTORY OF OTHER DISEASES OF THE NERVOUS SYSTEM AND SENSE ORGANS: ICD-10-CM

## 2022-05-10 DIAGNOSIS — Z01.818 ENCOUNTER FOR OTHER PREPROCEDURAL EXAMINATION: ICD-10-CM

## 2022-05-10 DIAGNOSIS — Z29.9 ENCOUNTER FOR PROPHYLACTIC MEASURES, UNSPECIFIED: ICD-10-CM

## 2022-05-10 DIAGNOSIS — Z90.89 ACQUIRED ABSENCE OF OTHER ORGANS: Chronic | ICD-10-CM

## 2022-05-10 DIAGNOSIS — Z98.891 HISTORY OF UTERINE SCAR FROM PREVIOUS SURGERY: Chronic | ICD-10-CM

## 2022-05-10 DIAGNOSIS — S72.009A FRACTURE OF UNSPECIFIED PART OF NECK OF UNSPECIFIED FEMUR, INITIAL ENCOUNTER FOR CLOSED FRACTURE: ICD-10-CM

## 2022-05-10 DIAGNOSIS — F41.9 ANXIETY DISORDER, UNSPECIFIED: ICD-10-CM

## 2022-05-10 DIAGNOSIS — Z98.890 OTHER SPECIFIED POSTPROCEDURAL STATES: Chronic | ICD-10-CM

## 2022-05-10 DIAGNOSIS — E78.5 HYPERLIPIDEMIA, UNSPECIFIED: ICD-10-CM

## 2022-05-10 LAB
ALBUMIN SERPL ELPH-MCNC: 3.9 G/DL — SIGNIFICANT CHANGE UP (ref 3.3–5)
ALP SERPL-CCNC: 64 U/L — SIGNIFICANT CHANGE UP (ref 30–120)
ALT FLD-CCNC: 19 U/L DA — SIGNIFICANT CHANGE UP (ref 10–60)
ANION GAP SERPL CALC-SCNC: 7 MMOL/L — SIGNIFICANT CHANGE UP (ref 5–17)
APTT BLD: 29.4 SEC — SIGNIFICANT CHANGE UP (ref 27.5–35.5)
AST SERPL-CCNC: 30 U/L — SIGNIFICANT CHANGE UP (ref 10–40)
BASOPHILS # BLD AUTO: 0.03 K/UL — SIGNIFICANT CHANGE UP (ref 0–0.2)
BASOPHILS NFR BLD AUTO: 0.6 % — SIGNIFICANT CHANGE UP (ref 0–2)
BILIRUB SERPL-MCNC: 0.4 MG/DL — SIGNIFICANT CHANGE UP (ref 0.2–1.2)
BLD GP AB SCN SERPL QL: SIGNIFICANT CHANGE UP
BUN SERPL-MCNC: 23 MG/DL — SIGNIFICANT CHANGE UP (ref 7–23)
CALCIUM SERPL-MCNC: 9.4 MG/DL — SIGNIFICANT CHANGE UP (ref 8.4–10.5)
CHLORIDE SERPL-SCNC: 102 MMOL/L — SIGNIFICANT CHANGE UP (ref 96–108)
CO2 SERPL-SCNC: 26 MMOL/L — SIGNIFICANT CHANGE UP (ref 22–31)
CREAT SERPL-MCNC: 0.84 MG/DL — SIGNIFICANT CHANGE UP (ref 0.5–1.3)
EGFR: 71 ML/MIN/1.73M2 — SIGNIFICANT CHANGE UP
EOSINOPHIL # BLD AUTO: 0.11 K/UL — SIGNIFICANT CHANGE UP (ref 0–0.5)
EOSINOPHIL NFR BLD AUTO: 2.1 % — SIGNIFICANT CHANGE UP (ref 0–6)
GLUCOSE SERPL-MCNC: 86 MG/DL — SIGNIFICANT CHANGE UP (ref 70–99)
HCT VFR BLD CALC: 38.2 % — SIGNIFICANT CHANGE UP (ref 34.5–45)
HGB BLD-MCNC: 13.1 G/DL — SIGNIFICANT CHANGE UP (ref 11.5–15.5)
IMM GRANULOCYTES NFR BLD AUTO: 0.4 % — SIGNIFICANT CHANGE UP (ref 0–1.5)
INR BLD: 0.95 RATIO — SIGNIFICANT CHANGE UP (ref 0.88–1.16)
LYMPHOCYTES # BLD AUTO: 1.93 K/UL — SIGNIFICANT CHANGE UP (ref 1–3.3)
LYMPHOCYTES # BLD AUTO: 37.3 % — SIGNIFICANT CHANGE UP (ref 13–44)
MCHC RBC-ENTMCNC: 34.2 PG — HIGH (ref 27–34)
MCHC RBC-ENTMCNC: 34.3 GM/DL — SIGNIFICANT CHANGE UP (ref 32–36)
MCV RBC AUTO: 99.7 FL — SIGNIFICANT CHANGE UP (ref 80–100)
MONOCYTES # BLD AUTO: 0.42 K/UL — SIGNIFICANT CHANGE UP (ref 0–0.9)
MONOCYTES NFR BLD AUTO: 8.1 % — SIGNIFICANT CHANGE UP (ref 2–14)
NEUTROPHILS # BLD AUTO: 2.66 K/UL — SIGNIFICANT CHANGE UP (ref 1.8–7.4)
NEUTROPHILS NFR BLD AUTO: 51.5 % — SIGNIFICANT CHANGE UP (ref 43–77)
NRBC # BLD: 0 /100 WBCS — SIGNIFICANT CHANGE UP (ref 0–0)
PLATELET # BLD AUTO: 192 K/UL — SIGNIFICANT CHANGE UP (ref 150–400)
POTASSIUM SERPL-MCNC: 4.4 MMOL/L — SIGNIFICANT CHANGE UP (ref 3.5–5.3)
POTASSIUM SERPL-SCNC: 4.4 MMOL/L — SIGNIFICANT CHANGE UP (ref 3.5–5.3)
PROT SERPL-MCNC: 7.6 G/DL — SIGNIFICANT CHANGE UP (ref 6–8.3)
PROTHROM AB SERPL-ACNC: 11.2 SEC — SIGNIFICANT CHANGE UP (ref 10.5–13.4)
RBC # BLD: 3.83 M/UL — SIGNIFICANT CHANGE UP (ref 3.8–5.2)
RBC # FLD: 13.7 % — SIGNIFICANT CHANGE UP (ref 10.3–14.5)
SARS-COV-2 RNA SPEC QL NAA+PROBE: SIGNIFICANT CHANGE UP
SODIUM SERPL-SCNC: 135 MMOL/L — SIGNIFICANT CHANGE UP (ref 135–145)
WBC # BLD: 5.17 K/UL — SIGNIFICANT CHANGE UP (ref 3.8–10.5)
WBC # FLD AUTO: 5.17 K/UL — SIGNIFICANT CHANGE UP (ref 3.8–10.5)

## 2022-05-10 PROCEDURE — 71045 X-RAY EXAM CHEST 1 VIEW: CPT | Mod: 26

## 2022-05-10 PROCEDURE — 72192 CT PELVIS W/O DYE: CPT | Mod: 26,MA

## 2022-05-10 PROCEDURE — 73502 X-RAY EXAM HIP UNI 2-3 VIEWS: CPT | Mod: 26,RT

## 2022-05-10 PROCEDURE — 99223 1ST HOSP IP/OBS HIGH 75: CPT | Mod: AI

## 2022-05-10 PROCEDURE — 99285 EMERGENCY DEPT VISIT HI MDM: CPT

## 2022-05-10 PROCEDURE — 93010 ELECTROCARDIOGRAM REPORT: CPT

## 2022-05-10 RX ORDER — SODIUM CHLORIDE 9 MG/ML
1000 INJECTION, SOLUTION INTRAVENOUS
Refills: 0 | Status: DISCONTINUED | OUTPATIENT
Start: 2022-05-11 | End: 2022-05-11

## 2022-05-10 RX ORDER — OXYCODONE HYDROCHLORIDE 5 MG/1
10 TABLET ORAL
Refills: 0 | Status: DISCONTINUED | OUTPATIENT
Start: 2022-05-10 | End: 2022-05-11

## 2022-05-10 RX ORDER — HYDROMORPHONE HYDROCHLORIDE 2 MG/ML
0.5 INJECTION INTRAMUSCULAR; INTRAVENOUS; SUBCUTANEOUS
Refills: 0 | Status: DISCONTINUED | OUTPATIENT
Start: 2022-05-10 | End: 2022-05-10

## 2022-05-10 RX ORDER — MORPHINE SULFATE 50 MG/1
4 CAPSULE, EXTENDED RELEASE ORAL ONCE
Refills: 0 | Status: DISCONTINUED | OUTPATIENT
Start: 2022-05-10 | End: 2022-05-10

## 2022-05-10 RX ORDER — CIPROFLOXACIN HCL 0.3 %
1 DROPS OPHTHALMIC (EYE)
Refills: 0 | Status: DISCONTINUED | OUTPATIENT
Start: 2022-05-10 | End: 2022-05-11

## 2022-05-10 RX ORDER — HYDROMORPHONE HYDROCHLORIDE 2 MG/ML
0.5 INJECTION INTRAMUSCULAR; INTRAVENOUS; SUBCUTANEOUS ONCE
Refills: 0 | Status: DISCONTINUED | OUTPATIENT
Start: 2022-05-10 | End: 2022-05-10

## 2022-05-10 RX ORDER — OXYCODONE HYDROCHLORIDE 5 MG/1
5 TABLET ORAL
Refills: 0 | Status: DISCONTINUED | OUTPATIENT
Start: 2022-05-10 | End: 2022-05-11

## 2022-05-10 RX ORDER — BACITRACIN ZINC 500 UNIT/G
1 OINTMENT IN PACKET (EA) TOPICAL
Refills: 0 | Status: DISCONTINUED | OUTPATIENT
Start: 2022-05-10 | End: 2022-05-11

## 2022-05-10 RX ORDER — FLUOXETINE HCL 10 MG
10 CAPSULE ORAL DAILY
Refills: 0 | Status: DISCONTINUED | OUTPATIENT
Start: 2022-05-10 | End: 2022-05-11

## 2022-05-10 RX ORDER — ACETAMINOPHEN 500 MG
1000 TABLET ORAL ONCE
Refills: 0 | Status: COMPLETED | OUTPATIENT
Start: 2022-05-10 | End: 2022-05-10

## 2022-05-10 RX ORDER — HEPARIN SODIUM 5000 [USP'U]/ML
5000 INJECTION INTRAVENOUS; SUBCUTANEOUS ONCE
Refills: 0 | Status: COMPLETED | OUTPATIENT
Start: 2022-05-10 | End: 2022-05-10

## 2022-05-10 RX ORDER — HYDROMORPHONE HYDROCHLORIDE 2 MG/ML
1 INJECTION INTRAMUSCULAR; INTRAVENOUS; SUBCUTANEOUS
Refills: 0 | Status: DISCONTINUED | OUTPATIENT
Start: 2022-05-10 | End: 2022-05-11

## 2022-05-10 RX ORDER — PREDNISOLONE SODIUM PHOSPHATE 1 %
1 DROPS OPHTHALMIC (EYE)
Refills: 0 | Status: DISCONTINUED | OUTPATIENT
Start: 2022-05-10 | End: 2022-05-11

## 2022-05-10 RX ORDER — ACETAMINOPHEN 500 MG
650 TABLET ORAL EVERY 8 HOURS
Refills: 0 | Status: DISCONTINUED | OUTPATIENT
Start: 2022-05-10 | End: 2022-05-11

## 2022-05-10 RX ORDER — ONDANSETRON 8 MG/1
4 TABLET, FILM COATED ORAL ONCE
Refills: 0 | Status: COMPLETED | OUTPATIENT
Start: 2022-05-10 | End: 2022-05-10

## 2022-05-10 RX ORDER — TETANUS TOXOID, REDUCED DIPHTHERIA TOXOID AND ACELLULAR PERTUSSIS VACCINE, ADSORBED 5; 2.5; 8; 8; 2.5 [IU]/.5ML; [IU]/.5ML; UG/.5ML; UG/.5ML; UG/.5ML
0.5 SUSPENSION INTRAMUSCULAR ONCE
Refills: 0 | Status: COMPLETED | OUTPATIENT
Start: 2022-05-10 | End: 2022-05-10

## 2022-05-10 RX ADMIN — Medication 400 MILLIGRAM(S): at 19:39

## 2022-05-10 RX ADMIN — Medication 1 DROP(S): at 18:28

## 2022-05-10 RX ADMIN — HYDROMORPHONE HYDROCHLORIDE 0.5 MILLIGRAM(S): 2 INJECTION INTRAMUSCULAR; INTRAVENOUS; SUBCUTANEOUS at 18:32

## 2022-05-10 RX ADMIN — Medication 1 APPLICATION(S): at 18:00

## 2022-05-10 RX ADMIN — TETANUS TOXOID, REDUCED DIPHTHERIA TOXOID AND ACELLULAR PERTUSSIS VACCINE, ADSORBED 0.5 MILLILITER(S): 5; 2.5; 8; 8; 2.5 SUSPENSION INTRAMUSCULAR at 15:46

## 2022-05-10 RX ADMIN — HYDROMORPHONE HYDROCHLORIDE 0.5 MILLIGRAM(S): 2 INJECTION INTRAMUSCULAR; INTRAVENOUS; SUBCUTANEOUS at 18:51

## 2022-05-10 RX ADMIN — MORPHINE SULFATE 4 MILLIGRAM(S): 50 CAPSULE, EXTENDED RELEASE ORAL at 16:02

## 2022-05-10 RX ADMIN — ONDANSETRON 4 MILLIGRAM(S): 8 TABLET, FILM COATED ORAL at 16:02

## 2022-05-10 RX ADMIN — HEPARIN SODIUM 5000 UNIT(S): 5000 INJECTION INTRAVENOUS; SUBCUTANEOUS at 17:49

## 2022-05-10 RX ADMIN — Medication 1 DROP(S): at 18:29

## 2022-05-10 RX ADMIN — HYDROMORPHONE HYDROCHLORIDE 1 MILLIGRAM(S): 2 INJECTION INTRAMUSCULAR; INTRAVENOUS; SUBCUTANEOUS at 21:34

## 2022-05-10 RX ADMIN — MORPHINE SULFATE 4 MILLIGRAM(S): 50 CAPSULE, EXTENDED RELEASE ORAL at 16:30

## 2022-05-10 RX ADMIN — HYDROMORPHONE HYDROCHLORIDE 0.5 MILLIGRAM(S): 2 INJECTION INTRAMUSCULAR; INTRAVENOUS; SUBCUTANEOUS at 18:03

## 2022-05-10 NOTE — ED PROVIDER NOTE - CLINICAL SUMMARY MEDICAL DECISION MAKING FREE TEXT BOX
Pt with right elbow abrasion and right hip pain, unable to ambulate after trip/fall. Plan for EKG, X-rays, labs, TDAP, Morphine, Zofran.

## 2022-05-10 NOTE — H&P ADULT - PROBLEM SELECTOR PLAN 5
patient with no contraindications to proceed to OR tomorrow as planned  no further testing is indicated  low CV risk for intermediate risk procedure  NPO p MN, IVF as ordered.

## 2022-05-10 NOTE — H&P ADULT - NSHPPHYSICALEXAM_GEN_ALL_CORE
PHYSICAL EXAM:  Vital Signs Last 24 Hrs  T(C): 36.4 (10 May 2022 14:46), Max: 36.4 (10 May 2022 14:46)  T(F): 97.5 (10 May 2022 14:46), Max: 97.5 (10 May 2022 14:46)  HR: 75 (10 May 2022 14:46) (75 - 75)  BP: 117/60 (10 May 2022 14:46) (117/60 - 117/60)  BP(mean): --  RR: 19 (10 May 2022 14:46) (19 - 19)  SpO2: 93% (10 May 2022 14:46) (93% - 93%)    GENERAL: NAD, well-groomed, well-developed  HEAD:  Atraumatic, Normocephalic  EYES:  conjunctiva and sclera clear  ENMT: Moist mucous membranes  NECK: Supple, No JVD  NERVOUS SYSTEM:  Alert & Oriented X3, Good concentration; able to move all extremities.   CHEST/LUNG: Clear to auscultation bilaterally;   HEART: Regular rate and rhythm; No murmurs  ABDOMEN: Soft, Nontender, Nondistended; Bowel sounds present  EXTREMITIES:  2+ Peripheral Pulses, No clubbing, cyanosis, or edema; right leg ROM limited by pain at hip joint, able to move ankle and knee.   LYMPH: No lymphadenopathy noted  SKIN: abrasion approx 1cm x1.5 cm right elbow - dry.
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
## 2022-05-10 NOTE — H&P ADULT - NSHPLABSRESULTS_GEN_ALL_CORE
Labs:                        13.1   5.17  )-----------( 192      ( 10 May 2022 15:45 )             38.2       05-10    135  |  102  |  23  ----------------------------<  86  4.4   |  26  |  0.84    Ca    9.4      10 May 2022 15:45    TPro  7.6  /  Alb  3.9  /  TBili  0.4  /  DBili  x   /  AST  30  /  ALT  19  /  AlkPhos  64  05-10          PT/INR - ( 10 May 2022 15:45 )   PT: 11.2 sec;   INR: 0.95 ratio    PTT - ( 10 May 2022 15:45 )  PTT:29.4 sec    Lactate Trend    CAPILLARY BLOOD GLUCOSE    EKG:  NSR  Personally Reviewed:  [x ] YES     Imaging:  right subcapital hip fx present on xray and CT   CXR - NAD  Personally Reviewed:  [ x] YES

## 2022-05-10 NOTE — PATIENT PROFILE ADULT - FALL HARM RISK - HARM RISK INTERVENTIONS

## 2022-05-10 NOTE — ED PROVIDER NOTE - CHPI ED SYMPTOMS NEG
no chest pain, no SOB, no abdominal pain, no nausea, no neck pain, no back pain, no headache, no dizziness/no loss of consciousness/no numbness/no vomiting/no weakness

## 2022-05-10 NOTE — H&P ADULT - ASSESSMENT
77F HLD, Anxiety, S/p left THR 2019, S/p right cataract surgery 5/4/22 admitted with right subcapital hip fracture s/p mechanical fall.

## 2022-05-11 ENCOUNTER — TRANSCRIPTION ENCOUNTER (OUTPATIENT)
Age: 77
End: 2022-05-11

## 2022-05-11 LAB
ANION GAP SERPL CALC-SCNC: 6 MMOL/L — SIGNIFICANT CHANGE UP (ref 5–17)
BUN SERPL-MCNC: 17 MG/DL — SIGNIFICANT CHANGE UP (ref 7–23)
CALCIUM SERPL-MCNC: 8.4 MG/DL — SIGNIFICANT CHANGE UP (ref 8.4–10.5)
CHLORIDE SERPL-SCNC: 98 MMOL/L — SIGNIFICANT CHANGE UP (ref 96–108)
CO2 SERPL-SCNC: 27 MMOL/L — SIGNIFICANT CHANGE UP (ref 22–31)
CREAT SERPL-MCNC: 0.92 MG/DL — SIGNIFICANT CHANGE UP (ref 0.5–1.3)
EGFR: 64 ML/MIN/1.73M2 — SIGNIFICANT CHANGE UP
GLUCOSE SERPL-MCNC: 180 MG/DL — HIGH (ref 70–99)
HCT VFR BLD CALC: 33.6 % — LOW (ref 34.5–45)
HGB BLD-MCNC: 11.3 G/DL — LOW (ref 11.5–15.5)
MCHC RBC-ENTMCNC: 33.6 GM/DL — SIGNIFICANT CHANGE UP (ref 32–36)
MCHC RBC-ENTMCNC: 34 PG — SIGNIFICANT CHANGE UP (ref 27–34)
MCV RBC AUTO: 101.2 FL — HIGH (ref 80–100)
NRBC # BLD: 0 /100 WBCS — SIGNIFICANT CHANGE UP (ref 0–0)
PLATELET # BLD AUTO: 138 K/UL — LOW (ref 150–400)
POTASSIUM SERPL-MCNC: 3.9 MMOL/L — SIGNIFICANT CHANGE UP (ref 3.5–5.3)
POTASSIUM SERPL-SCNC: 3.9 MMOL/L — SIGNIFICANT CHANGE UP (ref 3.5–5.3)
RBC # BLD: 3.32 M/UL — LOW (ref 3.8–5.2)
RBC # FLD: 13.7 % — SIGNIFICANT CHANGE UP (ref 10.3–14.5)
SODIUM SERPL-SCNC: 131 MMOL/L — LOW (ref 135–145)
WBC # BLD: 5.76 K/UL — SIGNIFICANT CHANGE UP (ref 3.8–10.5)
WBC # FLD AUTO: 5.76 K/UL — SIGNIFICANT CHANGE UP (ref 3.8–10.5)

## 2022-05-11 PROCEDURE — 99233 SBSQ HOSP IP/OBS HIGH 50: CPT

## 2022-05-11 DEVICE — SCREW CANN 16MM THREAD 6.5X80MM: Type: IMPLANTABLE DEVICE | Status: FUNCTIONAL

## 2022-05-11 DEVICE — SCREW CANN 16MM THREAD 6.5X85MM: Type: IMPLANTABLE DEVICE | Status: FUNCTIONAL

## 2022-05-11 DEVICE — GWIRE THRD TRC PT 2.8X300MM: Type: IMPLANTABLE DEVICE | Status: FUNCTIONAL

## 2022-05-11 RX ORDER — CHLORHEXIDINE GLUCONATE 213 G/1000ML
1 SOLUTION TOPICAL DAILY
Refills: 0 | Status: DISCONTINUED | OUTPATIENT
Start: 2022-05-11 | End: 2022-05-11

## 2022-05-11 RX ORDER — PREDNISOLONE SODIUM PHOSPHATE 1 %
1 DROPS OPHTHALMIC (EYE)
Refills: 0 | Status: DISCONTINUED | OUTPATIENT
Start: 2022-05-11 | End: 2022-05-12

## 2022-05-11 RX ORDER — CEFAZOLIN SODIUM 1 G
2000 VIAL (EA) INJECTION ONCE
Refills: 0 | Status: DISCONTINUED | OUTPATIENT
Start: 2022-05-11 | End: 2022-05-11

## 2022-05-11 RX ORDER — SENNA PLUS 8.6 MG/1
2 TABLET ORAL AT BEDTIME
Refills: 0 | Status: DISCONTINUED | OUTPATIENT
Start: 2022-05-11 | End: 2022-05-12

## 2022-05-11 RX ORDER — OXYCODONE HYDROCHLORIDE 5 MG/1
5 TABLET ORAL
Refills: 0 | Status: DISCONTINUED | OUTPATIENT
Start: 2022-05-11 | End: 2022-05-12

## 2022-05-11 RX ORDER — CIPROFLOXACIN HCL 0.3 %
1 DROPS OPHTHALMIC (EYE)
Refills: 0 | Status: DISCONTINUED | OUTPATIENT
Start: 2022-05-11 | End: 2022-05-12

## 2022-05-11 RX ORDER — CELECOXIB 200 MG/1
200 CAPSULE ORAL EVERY 12 HOURS
Refills: 0 | Status: DISCONTINUED | OUTPATIENT
Start: 2022-05-12 | End: 2022-05-12

## 2022-05-11 RX ORDER — ENOXAPARIN SODIUM 100 MG/ML
40 INJECTION SUBCUTANEOUS EVERY 24 HOURS
Refills: 0 | Status: DISCONTINUED | OUTPATIENT
Start: 2022-05-12 | End: 2022-05-12

## 2022-05-11 RX ORDER — ONDANSETRON 8 MG/1
4 TABLET, FILM COATED ORAL ONCE
Refills: 0 | Status: DISCONTINUED | OUTPATIENT
Start: 2022-05-11 | End: 2022-05-11

## 2022-05-11 RX ORDER — ONDANSETRON 8 MG/1
4 TABLET, FILM COATED ORAL EVERY 6 HOURS
Refills: 0 | Status: DISCONTINUED | OUTPATIENT
Start: 2022-05-11 | End: 2022-05-12

## 2022-05-11 RX ORDER — PANTOPRAZOLE SODIUM 20 MG/1
40 TABLET, DELAYED RELEASE ORAL ONCE
Refills: 0 | Status: COMPLETED | OUTPATIENT
Start: 2022-05-11 | End: 2022-05-11

## 2022-05-11 RX ORDER — ACETAMINOPHEN 500 MG
1000 TABLET ORAL ONCE
Refills: 0 | Status: COMPLETED | OUTPATIENT
Start: 2022-05-11 | End: 2022-05-11

## 2022-05-11 RX ORDER — SODIUM CHLORIDE 9 MG/ML
1000 INJECTION, SOLUTION INTRAVENOUS
Refills: 0 | Status: DISCONTINUED | OUTPATIENT
Start: 2022-05-11 | End: 2022-05-12

## 2022-05-11 RX ORDER — ACETAMINOPHEN 500 MG
1000 TABLET ORAL EVERY 8 HOURS
Refills: 0 | Status: DISCONTINUED | OUTPATIENT
Start: 2022-05-11 | End: 2022-05-12

## 2022-05-11 RX ORDER — CEFAZOLIN SODIUM 1 G
2000 VIAL (EA) INJECTION ONCE
Refills: 0 | Status: COMPLETED | OUTPATIENT
Start: 2022-05-11 | End: 2022-05-11

## 2022-05-11 RX ORDER — OXYCODONE HYDROCHLORIDE 5 MG/1
10 TABLET ORAL
Refills: 0 | Status: DISCONTINUED | OUTPATIENT
Start: 2022-05-11 | End: 2022-05-12

## 2022-05-11 RX ORDER — MAGNESIUM HYDROXIDE 400 MG/1
30 TABLET, CHEWABLE ORAL DAILY
Refills: 0 | Status: DISCONTINUED | OUTPATIENT
Start: 2022-05-11 | End: 2022-05-12

## 2022-05-11 RX ORDER — CEFAZOLIN SODIUM 1 G
2000 VIAL (EA) INJECTION EVERY 8 HOURS
Refills: 0 | Status: COMPLETED | OUTPATIENT
Start: 2022-05-11 | End: 2022-05-12

## 2022-05-11 RX ORDER — APREPITANT 80 MG/1
40 CAPSULE ORAL ONCE
Refills: 0 | Status: COMPLETED | OUTPATIENT
Start: 2022-05-11 | End: 2022-05-11

## 2022-05-11 RX ORDER — HYDROMORPHONE HYDROCHLORIDE 2 MG/ML
0.5 INJECTION INTRAMUSCULAR; INTRAVENOUS; SUBCUTANEOUS
Refills: 0 | Status: DISCONTINUED | OUTPATIENT
Start: 2022-05-11 | End: 2022-05-12

## 2022-05-11 RX ORDER — BACITRACIN ZINC 500 UNIT/G
1 OINTMENT IN PACKET (EA) TOPICAL
Refills: 0 | Status: DISCONTINUED | OUTPATIENT
Start: 2022-05-11 | End: 2022-05-12

## 2022-05-11 RX ORDER — HYDROMORPHONE HYDROCHLORIDE 2 MG/ML
0.5 INJECTION INTRAMUSCULAR; INTRAVENOUS; SUBCUTANEOUS
Refills: 0 | Status: DISCONTINUED | OUTPATIENT
Start: 2022-05-11 | End: 2022-05-11

## 2022-05-11 RX ORDER — CHLORHEXIDINE GLUCONATE 213 G/1000ML
1 SOLUTION TOPICAL ONCE
Refills: 0 | Status: COMPLETED | OUTPATIENT
Start: 2022-05-11 | End: 2022-05-11

## 2022-05-11 RX ORDER — PANTOPRAZOLE SODIUM 20 MG/1
40 TABLET, DELAYED RELEASE ORAL
Refills: 0 | Status: DISCONTINUED | OUTPATIENT
Start: 2022-05-11 | End: 2022-05-12

## 2022-05-11 RX ORDER — SODIUM CHLORIDE 9 MG/ML
1000 INJECTION, SOLUTION INTRAVENOUS
Refills: 0 | Status: DISCONTINUED | OUTPATIENT
Start: 2022-05-11 | End: 2022-05-11

## 2022-05-11 RX ORDER — FLUOXETINE HCL 10 MG
10 CAPSULE ORAL DAILY
Refills: 0 | Status: DISCONTINUED | OUTPATIENT
Start: 2022-05-11 | End: 2022-05-12

## 2022-05-11 RX ORDER — POLYETHYLENE GLYCOL 3350 17 G/17G
17 POWDER, FOR SOLUTION ORAL AT BEDTIME
Refills: 0 | Status: DISCONTINUED | OUTPATIENT
Start: 2022-05-11 | End: 2022-05-12

## 2022-05-11 RX ADMIN — SODIUM CHLORIDE 100 MILLILITER(S): 9 INJECTION, SOLUTION INTRAVENOUS at 15:37

## 2022-05-11 RX ADMIN — Medication 1 DROP(S): at 00:01

## 2022-05-11 RX ADMIN — Medication 1000 MILLIGRAM(S): at 16:01

## 2022-05-11 RX ADMIN — Medication 400 MILLIGRAM(S): at 10:28

## 2022-05-11 RX ADMIN — Medication 1 APPLICATION(S): at 05:47

## 2022-05-11 RX ADMIN — Medication 1 APPLICATION(S): at 19:11

## 2022-05-11 RX ADMIN — SENNA PLUS 2 TABLET(S): 8.6 TABLET ORAL at 21:59

## 2022-05-11 RX ADMIN — SODIUM CHLORIDE 75 MILLILITER(S): 9 INJECTION, SOLUTION INTRAVENOUS at 06:01

## 2022-05-11 RX ADMIN — HYDROMORPHONE HYDROCHLORIDE 1 MILLIGRAM(S): 2 INJECTION INTRAMUSCULAR; INTRAVENOUS; SUBCUTANEOUS at 08:31

## 2022-05-11 RX ADMIN — POLYETHYLENE GLYCOL 3350 17 GRAM(S): 17 POWDER, FOR SOLUTION ORAL at 21:59

## 2022-05-11 RX ADMIN — Medication 1000 MILLIGRAM(S): at 10:53

## 2022-05-11 RX ADMIN — Medication 1 DROP(S): at 19:08

## 2022-05-11 RX ADMIN — APREPITANT 40 MILLIGRAM(S): 80 CAPSULE ORAL at 10:27

## 2022-05-11 RX ADMIN — SODIUM CHLORIDE 100 MILLILITER(S): 9 INJECTION, SOLUTION INTRAVENOUS at 21:59

## 2022-05-11 RX ADMIN — Medication 1 DROP(S): at 05:46

## 2022-05-11 RX ADMIN — Medication 1000 MILLIGRAM(S): at 15:39

## 2022-05-11 RX ADMIN — Medication 1 DROP(S): at 19:10

## 2022-05-11 RX ADMIN — HYDROMORPHONE HYDROCHLORIDE 1 MILLIGRAM(S): 2 INJECTION INTRAMUSCULAR; INTRAVENOUS; SUBCUTANEOUS at 08:01

## 2022-05-11 RX ADMIN — HYDROMORPHONE HYDROCHLORIDE 1 MILLIGRAM(S): 2 INJECTION INTRAMUSCULAR; INTRAVENOUS; SUBCUTANEOUS at 05:11

## 2022-05-11 RX ADMIN — HYDROMORPHONE HYDROCHLORIDE 1 MILLIGRAM(S): 2 INJECTION INTRAMUSCULAR; INTRAVENOUS; SUBCUTANEOUS at 04:52

## 2022-05-11 RX ADMIN — PANTOPRAZOLE SODIUM 40 MILLIGRAM(S): 20 TABLET, DELAYED RELEASE ORAL at 20:58

## 2022-05-11 RX ADMIN — Medication 100 MILLIGRAM(S): at 20:12

## 2022-05-11 RX ADMIN — CHLORHEXIDINE GLUCONATE 1 APPLICATION(S): 213 SOLUTION TOPICAL at 10:33

## 2022-05-11 NOTE — DISCHARGE NOTE PROVIDER - CARE PROVIDERS DIRECT ADDRESSES
,DirectAddress_Unknown ,DirectAddress_Unknown,[email or web address]

## 2022-05-11 NOTE — DISCHARGE NOTE PROVIDER - NSDCCPTREATMENT_GEN_ALL_CORE_FT
PRINCIPAL PROCEDURE  Procedure: Percutaneous pinning of right hip  Findings and Treatment: right femoral neck fracture

## 2022-05-11 NOTE — PRE-OP CHECKLIST - SELECT TESTS ORDERED
CT pelvis without contrast/right hip x-ray/BMP/CBC/PT/PTT/INR/Type and Screen/EKG/CXR/COVID-19
CBC/CMP/PT/PTT/INR/EKG/COVID-19

## 2022-05-11 NOTE — DISCHARGE NOTE PROVIDER - NSDCMRMEDTOKEN_GEN_ALL_CORE_FT
acetaminophen 325 mg oral tablet: 2 tab(s) orally every 6 hours, As Needed for mild pain (1-3) or fever (temperature greater than 100.3F), max daily 3000mg  celecoxib 200 mg oral capsule: 1 cap(s) orally every 12 hours, please continue for 6 weeks post surgery  docusate sodium 100 mg oral capsule: 1 cap(s) orally 3 times a day, As Needed for constipation  Ecotrin 325 mg oral delayed release tablet: 1 tab orally every 12 hours, please continue for 4 weeks post surgery  FLUoxetine 10 mg oral tablet: 1 tab(s) orally once a day  pantoprazole 40 mg oral delayed release tablet: 1 tab(s) orally once a day, please continue for 4 weeks post surgery  Percocet 5/325 oral tablet: 1 tab po q4h prn, max 6 tabs daily, wean off as kika   polyethylene glycol 3350 oral powder for reconstitution: 17 gram(s) orally once a day, As Needed for constipation  senna oral tablet: 2 tab(s) orally once a day (at bedtime), As needed, Constipation  zolpidem 5 mg oral tablet: 1 tab(s) orally once a day (at bedtime), As Needed   acetaminophen 500 mg oral tablet: 2 tab(s) orally every 8 hours  bacitracin 500 units/g topical ointment: 1 application topically 2 times a day  celecoxib 200 mg oral capsule: 1 cap(s) orally every 12 hours, please continue for 6 weeks post surgery  ciprofloxacin 0.3% ophthalmic solution: 1 drop(s) to each affected eye 4 times a day  docusate sodium 100 mg oral capsule: 1 cap(s) orally 3 times a day, As Needed for constipation  Ecotrin Adult Low Strength 81 mg oral delayed release tablet: 1 tab(s) orally every 12 hours   take 2 hours before celebrex  FLUoxetine 10 mg oral tablet: 1 tab(s) orally once a day  oxyCODONE 5 mg oral tablet: 1 tab(s) orally every 4 hours, As Needed - Pain MDD:6  istop: [id number]  pantoprazole 40 mg oral delayed release tablet: 1 tab(s) orally once a day, please continue for 4 weeks post surgery  polyethylene glycol 3350 oral powder for reconstitution: 17 gram(s) orally once a day, As Needed for constipation  prednisoLONE acetate 1% ophthalmic suspension: 1 drop(s) to each affected eye 4 times a day  senna oral tablet: 2 tab(s) orally once a day (at bedtime), As needed, Constipation

## 2022-05-11 NOTE — DISCHARGE NOTE PROVIDER - HOSPITAL COURSE
This is a  y.o. 77 F admitted to  [facility] through the ED on 5/10/22 after sustaining a Right hip fracture post fall while getting [place] of an [employer] [place].  Received H&P including complete medical history, physical exam, medication review, preop clearance by Jenny Hernandez MD.  Surgical consent was obtained.  Transferred 5/11/22 into OR. Received [facility]-Op parenteral antibiotics for SCIP protocol .  Patient underwent uncomplicated closed reduction percutaneous pinning Right hip by Dr. Errol Zhang without complication.  Stable PACU course then transferred to Surgical floor for acute postoperative care.  Pre-Op meds continued post-op and modified as medically necessary. Post-Op medical eval by Hospitalist team for post-op medical comanagement.  Extended course of IV  antibiotics to complete surgical prophylaxis.   VTE prophylaxis with Lovenox 40mg SQ daily + PAS.  Post-Op PT/OT modalities for ambulation, stairs. ADLs, &  transfers.  Stable post-op course. Tolerated diet well.  Stable neuromotor exam of Left Right Lower extremity.  All D/C instructions and medication reconciliation completed including pain Rx & VTEP Rx.  
(0) understands/communicates without difficulty

## 2022-05-11 NOTE — DISCHARGE NOTE PROVIDER - CARE PROVIDER_API CALL
Errol Zhang (DO)  Orthopaedic Surgery  [address] [place] [address]  Phone: [phone]  Fax: [phone]  Established Patient  Follow Up Time: 2 weeks   Errol Zhang ()  Orthopaedic Surgery  [address] [place] [address]  Phone: [phone]  Fax: [phone]  Established Patient  Follow Up Time: 2 weeks    Reyes, John A (MD)  Internal Medicine  [address] [place] [address], [address]  [place] [address]  Phone: [phone]  Fax: [phone]  Follow Up Time:

## 2022-05-11 NOTE — DISCHARGE NOTE PROVIDER - PROVIDER TOKENS
PROVIDER:[TOKEN:[[id number]],FOLLOWUP:[2 weeks],ESTABLISHEDPATIENT:[T]] PROVIDER:[TOKEN:[[id number]],FOLLOWUP:[2 weeks],ESTABLISHEDPATIENT:[T]],PROVIDER:[TOKEN:[[id number]]]

## 2022-05-11 NOTE — DISCHARGE NOTE PROVIDER - NSDCCPCAREPLAN_GEN_ALL_CORE_FT
PRINCIPAL DISCHARGE DIAGNOSIS  Diagnosis: Fracture of femoral neck, right, closed  Assessment and Plan of Treatment: Physicial Therapy, Occupational Therapy for ambulation, transfers, ADLs  WBAT  ice hip 20 minutes every several times daily as needed  Daily dressing changes as needed; leave incision open to air once dry

## 2022-05-12 ENCOUNTER — TRANSCRIPTION ENCOUNTER (OUTPATIENT)
Age: 77
End: 2022-05-12

## 2022-05-12 VITALS
TEMPERATURE: 98 F | OXYGEN SATURATION: 95 % | DIASTOLIC BLOOD PRESSURE: 65 MMHG | RESPIRATION RATE: 18 BRPM | HEART RATE: 63 BPM | SYSTOLIC BLOOD PRESSURE: 104 MMHG

## 2022-05-12 LAB
ANION GAP SERPL CALC-SCNC: 7 MMOL/L — SIGNIFICANT CHANGE UP (ref 5–17)
BUN SERPL-MCNC: 18 MG/DL — SIGNIFICANT CHANGE UP (ref 7–23)
CALCIUM SERPL-MCNC: 8.9 MG/DL — SIGNIFICANT CHANGE UP (ref 8.4–10.5)
CHLORIDE SERPL-SCNC: 105 MMOL/L — SIGNIFICANT CHANGE UP (ref 96–108)
CO2 SERPL-SCNC: 26 MMOL/L — SIGNIFICANT CHANGE UP (ref 22–31)
CREAT SERPL-MCNC: 0.82 MG/DL — SIGNIFICANT CHANGE UP (ref 0.5–1.3)
EGFR: 73 ML/MIN/1.73M2 — SIGNIFICANT CHANGE UP
GLUCOSE SERPL-MCNC: 129 MG/DL — HIGH (ref 70–99)
HCT VFR BLD CALC: 33.7 % — LOW (ref 34.5–45)
HGB BLD-MCNC: 11.4 G/DL — LOW (ref 11.5–15.5)
MCHC RBC-ENTMCNC: 33.8 GM/DL — SIGNIFICANT CHANGE UP (ref 32–36)
MCHC RBC-ENTMCNC: 34.1 PG — HIGH (ref 27–34)
MCV RBC AUTO: 100.9 FL — HIGH (ref 80–100)
NRBC # BLD: 0 /100 WBCS — SIGNIFICANT CHANGE UP (ref 0–0)
PLATELET # BLD AUTO: 154 K/UL — SIGNIFICANT CHANGE UP (ref 150–400)
POTASSIUM SERPL-MCNC: 4.3 MMOL/L — SIGNIFICANT CHANGE UP (ref 3.5–5.3)
POTASSIUM SERPL-SCNC: 4.3 MMOL/L — SIGNIFICANT CHANGE UP (ref 3.5–5.3)
RBC # BLD: 3.34 M/UL — LOW (ref 3.8–5.2)
RBC # FLD: 13.5 % — SIGNIFICANT CHANGE UP (ref 10.3–14.5)
SODIUM SERPL-SCNC: 138 MMOL/L — SIGNIFICANT CHANGE UP (ref 135–145)
WBC # BLD: 7.84 K/UL — SIGNIFICANT CHANGE UP (ref 3.8–10.5)
WBC # FLD AUTO: 7.84 K/UL — SIGNIFICANT CHANGE UP (ref 3.8–10.5)

## 2022-05-12 PROCEDURE — 97530 THERAPEUTIC ACTIVITIES: CPT

## 2022-05-12 PROCEDURE — 85027 COMPLETE CBC AUTOMATED: CPT

## 2022-05-12 PROCEDURE — C9399: CPT

## 2022-05-12 PROCEDURE — 80048 BASIC METABOLIC PNL TOTAL CA: CPT

## 2022-05-12 PROCEDURE — 90715 TDAP VACCINE 7 YRS/> IM: CPT

## 2022-05-12 PROCEDURE — 85610 PROTHROMBIN TIME: CPT

## 2022-05-12 PROCEDURE — 97165 OT EVAL LOW COMPLEX 30 MIN: CPT

## 2022-05-12 PROCEDURE — 97110 THERAPEUTIC EXERCISES: CPT

## 2022-05-12 PROCEDURE — 85025 COMPLETE CBC W/AUTO DIFF WBC: CPT

## 2022-05-12 PROCEDURE — 87635 SARS-COV-2 COVID-19 AMP PRB: CPT

## 2022-05-12 PROCEDURE — 36415 COLL VENOUS BLD VENIPUNCTURE: CPT

## 2022-05-12 PROCEDURE — 86850 RBC ANTIBODY SCREEN: CPT

## 2022-05-12 PROCEDURE — 97161 PT EVAL LOW COMPLEX 20 MIN: CPT

## 2022-05-12 PROCEDURE — 97116 GAIT TRAINING THERAPY: CPT

## 2022-05-12 PROCEDURE — 96374 THER/PROPH/DIAG INJ IV PUSH: CPT

## 2022-05-12 PROCEDURE — 93005 ELECTROCARDIOGRAM TRACING: CPT

## 2022-05-12 PROCEDURE — 97535 SELF CARE MNGMENT TRAINING: CPT

## 2022-05-12 PROCEDURE — 90471 IMMUNIZATION ADMIN: CPT

## 2022-05-12 PROCEDURE — 86900 BLOOD TYPING SEROLOGIC ABO: CPT

## 2022-05-12 PROCEDURE — 72192 CT PELVIS W/O DYE: CPT | Mod: MA

## 2022-05-12 PROCEDURE — 96375 TX/PRO/DX INJ NEW DRUG ADDON: CPT

## 2022-05-12 PROCEDURE — 86901 BLOOD TYPING SEROLOGIC RH(D): CPT

## 2022-05-12 PROCEDURE — 94664 DEMO&/EVAL PT USE INHALER: CPT

## 2022-05-12 PROCEDURE — 99285 EMERGENCY DEPT VISIT HI MDM: CPT | Mod: 25

## 2022-05-12 PROCEDURE — 71045 X-RAY EXAM CHEST 1 VIEW: CPT

## 2022-05-12 PROCEDURE — 85730 THROMBOPLASTIN TIME PARTIAL: CPT

## 2022-05-12 PROCEDURE — 99238 HOSP IP/OBS DSCHRG MGMT 30/<: CPT

## 2022-05-12 PROCEDURE — 76000 FLUOROSCOPY <1 HR PHYS/QHP: CPT

## 2022-05-12 PROCEDURE — C1713: CPT

## 2022-05-12 PROCEDURE — 73502 X-RAY EXAM HIP UNI 2-3 VIEWS: CPT

## 2022-05-12 PROCEDURE — 80053 COMPREHEN METABOLIC PANEL: CPT

## 2022-05-12 RX ORDER — PANTOPRAZOLE SODIUM 20 MG/1
1 TABLET, DELAYED RELEASE ORAL
Qty: 0 | Refills: 0 | DISCHARGE

## 2022-05-12 RX ORDER — PANTOPRAZOLE SODIUM 20 MG/1
1 TABLET, DELAYED RELEASE ORAL
Qty: 30 | Refills: 0
Start: 2022-05-12 | End: 2022-06-10

## 2022-05-12 RX ORDER — ASPIRIN/CALCIUM CARB/MAGNESIUM 324 MG
1 TABLET ORAL
Qty: 84 | Refills: 0
Start: 2022-05-12 | End: 2022-06-22

## 2022-05-12 RX ORDER — CELECOXIB 200 MG/1
1 CAPSULE ORAL
Qty: 42 | Refills: 0
Start: 2022-05-12 | End: 2022-06-01

## 2022-05-12 RX ORDER — ZOLPIDEM TARTRATE 10 MG/1
1 TABLET ORAL
Qty: 0 | Refills: 0 | DISCHARGE

## 2022-05-12 RX ORDER — PREDNISOLONE SODIUM PHOSPHATE 1 %
1 DROPS OPHTHALMIC (EYE)
Qty: 0 | Refills: 0 | DISCHARGE
Start: 2022-05-12

## 2022-05-12 RX ORDER — CIPROFLOXACIN HCL 0.3 %
1 DROPS OPHTHALMIC (EYE)
Qty: 0 | Refills: 0 | DISCHARGE
Start: 2022-05-12

## 2022-05-12 RX ORDER — OXYCODONE HYDROCHLORIDE 5 MG/1
1 TABLET ORAL
Qty: 42 | Refills: 0
Start: 2022-05-12 | End: 2022-05-18

## 2022-05-12 RX ORDER — ACETAMINOPHEN 500 MG
2 TABLET ORAL
Qty: 0 | Refills: 0 | DISCHARGE
Start: 2022-05-12

## 2022-05-12 RX ORDER — BACITRACIN ZINC 500 UNIT/G
1 OINTMENT IN PACKET (EA) TOPICAL
Qty: 0 | Refills: 0 | DISCHARGE
Start: 2022-05-12

## 2022-05-12 RX ORDER — ASPIRIN/CALCIUM CARB/MAGNESIUM 324 MG
1 TABLET ORAL
Qty: 0 | Refills: 0 | DISCHARGE

## 2022-05-12 RX ADMIN — CELECOXIB 200 MILLIGRAM(S): 200 CAPSULE ORAL at 09:30

## 2022-05-12 RX ADMIN — Medication 10 MILLIGRAM(S): at 12:19

## 2022-05-12 RX ADMIN — Medication 1000 MILLIGRAM(S): at 09:30

## 2022-05-12 RX ADMIN — CELECOXIB 200 MILLIGRAM(S): 200 CAPSULE ORAL at 08:57

## 2022-05-12 RX ADMIN — PANTOPRAZOLE SODIUM 40 MILLIGRAM(S): 20 TABLET, DELAYED RELEASE ORAL at 06:20

## 2022-05-12 RX ADMIN — Medication 1 DROP(S): at 06:20

## 2022-05-12 RX ADMIN — Medication 100 MILLIGRAM(S): at 04:09

## 2022-05-12 RX ADMIN — Medication 1 APPLICATION(S): at 06:20

## 2022-05-12 RX ADMIN — Medication 1000 MILLIGRAM(S): at 18:16

## 2022-05-12 RX ADMIN — SODIUM CHLORIDE 100 MILLILITER(S): 9 INJECTION, SOLUTION INTRAVENOUS at 08:56

## 2022-05-12 RX ADMIN — ENOXAPARIN SODIUM 40 MILLIGRAM(S): 100 INJECTION SUBCUTANEOUS at 08:56

## 2022-05-12 RX ADMIN — Medication 1 DROP(S): at 00:04

## 2022-05-12 RX ADMIN — Medication 1000 MILLIGRAM(S): at 00:03

## 2022-05-12 RX ADMIN — Medication 1 DROP(S): at 12:20

## 2022-05-12 RX ADMIN — Medication 1000 MILLIGRAM(S): at 00:08

## 2022-05-12 RX ADMIN — Medication 1000 MILLIGRAM(S): at 17:00

## 2022-05-12 RX ADMIN — Medication 1000 MILLIGRAM(S): at 08:57
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
## 2022-05-12 NOTE — PHYSICAL THERAPY INITIAL EVALUATION ADULT - MANUAL MUSCLE TESTING RESULTS, REHAB EVAL
Bilateral UE and left LE atleast a 3+/5. Right hip not tested due to surgery/grossly assessed due to

## 2022-05-12 NOTE — DISCHARGE NOTE NURSING/CASE MANAGEMENT/SOCIAL WORK - NSDCVIVACCINE_GEN_ALL_CORE_FT
Tdap; 10-May-2022 15:46; Abbey Camara (RN); [employer]; [id number] (Exp. Date: 11-Mar-2024); IntraMuscular; Deltoid Right.; 0.5 milliLiter(s); VIS (VIS Published: 09-May-2013, VIS Presented: 10-May-2022);
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
## 2022-05-12 NOTE — DISCHARGE NOTE NURSING/CASE MANAGEMENT/SOCIAL WORK - NSSCNAMETXT_GEN_ALL_CORE
[facility] (formerly [facility])   [address] [place] [address]

## 2022-05-12 NOTE — PROGRESS NOTE ADULT - ASSESSMENT
77F HLD, Anxiety, S/p left THR 2019, S/p right cataract surgery 5/4/22 admitted with right subcapital hip fracture s/p mechanical fall. 
77F HLD, Anxiety, S/p left THR 2019, S/p right cataract surgery 5/4/22 admitted with right subcapital hip fracture s/p mechanical fall.

## 2022-05-12 NOTE — PHYSICAL THERAPY INITIAL EVALUATION ADULT - ADDITIONAL COMMENTS
Pt has no step to negotiate when getting into the house. Pt has 13 steps to negotiate to get to her room. Pt plans to stay on second floor when at home. Pt has no step to negotiate when getting into the house. Pt has 13 steps to negotiate to get to her room with hand rail. Pt plans to stay on second floor when at home. Spouse able to assist pt upon D/C home.

## 2022-05-12 NOTE — DISCHARGE NOTE NURSING/CASE MANAGEMENT/SOCIAL WORK - PATIENT PORTAL LINK FT
You can access the FollowMyHealth Patient Portal offered by [facility] by registering at the following website: [email or web address]. By joining [facility]’s FollowMyHealth portal, you will also be able to view your health information using other applications (apps) compatible with our system.

## 2022-05-12 NOTE — PROGRESS NOTE ADULT - PROBLEM SELECTOR PLAN 1
s/p pinning of right hip  Pain Management: acceptable- continue current care Tylenol ATC/Celebrex ATC/ Oxycodone PRN  Continue PT/OT  DVT proph: [  ] low risk - Aspirin  [x  ] high risk -Lovenox [  ] high risk - Eliquis [  ] other:_________  DC plan:  [  ] Home with HC  [  ] Rehab   [x  ] TBD  [  ]other:___________
s/p pinning of right hip  Pain Management: acceptable- continue current care Tylenol ATC/Celebrex ATC/ Oxycodone PRN  Continue PT/OT  DVT proph: [  ] low risk - Aspirin  [x  ] high risk -Lovenox [  ] high risk - Eliquis [  ] other:_________  DC plan:  [  ] Home with HC  [  ] Rehab   [x  ] TBD  [  ]other:___________

## 2022-05-12 NOTE — PHYSICAL THERAPY INITIAL EVALUATION ADULT - RANGE OF MOTION EXAMINATION, REHAB EVAL
Right hip not tested due to surgery/bilateral upper extremity ROM was WFL (within functional limits)/Left LE ROM was WFL (within functional limits)

## 2022-05-12 NOTE — OCCUPATIONAL THERAPY INITIAL EVALUATION ADULT - ADDITIONAL COMMENTS
Pt lives w/ spouse in a private home no steps through garage and no steps inside the home.  +stall  Pt reports owning RW SAC shower chair

## 2022-05-12 NOTE — PROGRESS NOTE ADULT - PROBLEM SELECTOR PLAN 2
recent cataract surgery of right eye  continue eye drops - Cipro and prednisolone
recent cataract surgery of right eye  continue eye drops - Cipro and prednisolone

## 2022-05-12 NOTE — DISCHARGE NOTE NURSING/CASE MANAGEMENT/SOCIAL WORK - NSDCPEFALRISK_GEN_ALL_CORE
For information on Fall & Injury Prevention, visit: [email or web address] OR  [email or web address] OR  https://www.cdc.gov/steadi/patient.html

## 2022-06-08 NOTE — PATIENT PROFILE ADULT - OVER THE PAST TWO WEEKS, HAVE YOU FELT LITTLE INTEREST OR PLEASURE IN DOING THINGS?
Instructions: This plan will send the code FBSE to the PM system.  DO NOT or CHANGE the price.
Detail Level: Generalized
Price (Do Not Change): 0.00
no

## 2022-07-08 ENCOUNTER — APPOINTMENT (OUTPATIENT)
Dept: INTERNAL MEDICINE | Facility: CLINIC | Age: 77
End: 2022-07-08

## 2022-07-08 VITALS
BODY MASS INDEX: 22.93 KG/M2 | RESPIRATION RATE: 14 BRPM | HEART RATE: 74 BPM | TEMPERATURE: 98.4 F | OXYGEN SATURATION: 96 % | DIASTOLIC BLOOD PRESSURE: 72 MMHG | HEIGHT: 64 IN | SYSTOLIC BLOOD PRESSURE: 100 MMHG | WEIGHT: 134.31 LBS

## 2022-07-08 DIAGNOSIS — Z00.00 ENCOUNTER FOR GENERAL ADULT MEDICAL EXAMINATION W/OUT ABNORMAL FINDINGS: ICD-10-CM

## 2022-07-08 PROCEDURE — G0439: CPT

## 2022-07-08 NOTE — HEALTH RISK ASSESSMENT
[Very Good] : ~his/her~  mood as very good [Never] : Never [No] : No [1 or 2 (0 pts)] : 1 or 2 (0 points) [Never (0 pts)] : Never (0 points) [0] : 2) Feeling down, depressed, or hopeless: Not at all (0) [PHQ-2 Positive] : PHQ-2 Positive [Patient reported bone density results were abnormal] : Patient reported bone density results were abnormal [HIV test declined] : HIV test declined [Hepatitis C test declined] : Hepatitis C test declined [None] : None [Feels Safe at Home] : Feels safe at home [Fully functional (bathing, dressing, toileting, transferring, walking, feeding)] : Fully functional (bathing, dressing, toileting, transferring, walking, feeding) [Fully functional (using the telephone, shopping, preparing meals, housekeeping, doing laundry, using] : Fully functional and needs no help or supervision to perform IADLs (using the telephone, shopping, preparing meals, housekeeping, doing laundry, using transportation, managing medications and managing finances) [de-identified] : PT and gong to join gym [de-identified] : good [Change in mental status noted] : No change in mental status noted [Language] : denies difficulty with language [Behavior] : denies difficulty with behavior [Learning/Retaining New Information] : denies difficulty learning/retaining new information [Handling Complex Tasks] : denies difficulty handling complex tasks [Reasoning] : denies difficulty with reasoning [Spatial Ability and Orientation] : denies difficulty with spatial ability and orientation [Reports changes in hearing] : Reports no changes in hearing [Reports changes in vision] : Reports no changes in vision [Reports changes in dental health] : Reports no changes in dental health [BoneDensityDate] : 2020 [BoneDensityComments] : recent rt hip fx

## 2022-07-08 NOTE — PHYSICAL EXAM
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
## 2022-08-09 ENCOUNTER — APPOINTMENT (OUTPATIENT)
Dept: INTERNAL MEDICINE | Facility: CLINIC | Age: 77
End: 2022-08-09

## 2022-08-09 VITALS
RESPIRATION RATE: 16 BRPM | HEART RATE: 75 BPM | OXYGEN SATURATION: 98 % | SYSTOLIC BLOOD PRESSURE: 100 MMHG | DIASTOLIC BLOOD PRESSURE: 68 MMHG | HEIGHT: 63 IN | TEMPERATURE: 99.1 F

## 2022-08-09 DIAGNOSIS — M25.552 PAIN IN LEFT HIP: ICD-10-CM

## 2022-08-09 DIAGNOSIS — R92.8 OTHER ABNORMAL AND INCONCLUSIVE FINDINGS ON DIAGNOSTIC IMAGING OF BREAST: ICD-10-CM

## 2022-08-09 DIAGNOSIS — R21 RASH AND OTHER NONSPECIFIC SKIN ERUPTION: ICD-10-CM

## 2022-08-09 PROCEDURE — 99214 OFFICE O/P EST MOD 30 MIN: CPT

## 2022-08-09 RX ORDER — CLINDAMYCIN PHOSPHATE 1 G/10ML
1 GEL TOPICAL 3 TIMES DAILY
Qty: 1 | Refills: 5 | Status: ACTIVE | COMMUNITY
Start: 2022-08-09 | End: 1900-01-01

## 2022-08-09 RX ORDER — CLINDAMYCIN PHOSPHATE 10 MG/1
1 SWAB TOPICAL TWICE DAILY
Qty: 1 | Refills: 0 | Status: ACTIVE | COMMUNITY
Start: 2022-08-09 | End: 1900-01-01

## 2022-08-30 NOTE — PHYSICAL THERAPY INITIAL EVALUATION ADULT - BALANCE DISTURBANCE, IDENTIFIED IMPAIRMENT CONTRIBUTE, REHAB EVAL
[FreeTextEntry1] : 30 yo G0, with stage 2 endometriosis, Corina in place for annual exam.\par \par - f/up pap/HPV\par - sent for STI testing, f/up GC/CT\par - kyleena until 2025\par - discussed treatment options for endometriosis including: NSAIDS 2 days prior and first 3 days of cramping, adding OCPs. Patient will try NSAIDS, considering OCPs. Discussed surgery would be 2nd/3rd line option at this point\par - return to office 1yr annual or PRN decreased ROM/decreased strength

## 2022-10-06 NOTE — ED PROVIDER NOTE - PHYSICAL EXAMINATION
CON: ao x 3, HENMT: clear oropharynx, soft neck, HEAD: atraumatic, CV: rrr, equal pulses b/l, RESP: cta b/l, GI: +BS, soft, right abd tenderness, no rebound, no guarding, SKIN: no rash, MSK: no deformities, NEURO: no gross motor or sensory deficit No cyanosis, no pallor, no jaundice, no rash

## 2022-11-15 ENCOUNTER — APPOINTMENT (OUTPATIENT)
Dept: ULTRASOUND IMAGING | Facility: HOSPITAL | Age: 77
End: 2022-11-15

## 2022-11-15 ENCOUNTER — OUTPATIENT (OUTPATIENT)
Dept: OUTPATIENT SERVICES | Facility: HOSPITAL | Age: 77
LOS: 1 days | End: 2022-11-15
Payer: MEDICARE

## 2022-11-15 ENCOUNTER — APPOINTMENT (OUTPATIENT)
Dept: MAMMOGRAPHY | Facility: HOSPITAL | Age: 77
End: 2022-11-15

## 2022-11-15 ENCOUNTER — RESULT REVIEW (OUTPATIENT)
Age: 77
End: 2022-11-15

## 2022-11-15 DIAGNOSIS — Z98.891 HISTORY OF UTERINE SCAR FROM PREVIOUS SURGERY: Chronic | ICD-10-CM

## 2022-11-15 DIAGNOSIS — Z98.890 OTHER SPECIFIED POSTPROCEDURAL STATES: Chronic | ICD-10-CM

## 2022-11-15 DIAGNOSIS — Z90.89 ACQUIRED ABSENCE OF OTHER ORGANS: Chronic | ICD-10-CM

## 2022-11-15 DIAGNOSIS — R21 RASH AND OTHER NONSPECIFIC SKIN ERUPTION: ICD-10-CM

## 2022-11-15 PROCEDURE — 77067 SCR MAMMO BI INCL CAD: CPT

## 2022-11-15 PROCEDURE — 77063 BREAST TOMOSYNTHESIS BI: CPT | Mod: 26

## 2022-11-15 PROCEDURE — 77067 SCR MAMMO BI INCL CAD: CPT | Mod: 26

## 2022-11-15 PROCEDURE — 77063 BREAST TOMOSYNTHESIS BI: CPT

## 2022-12-29 ENCOUNTER — NON-APPOINTMENT (OUTPATIENT)
Age: 77
End: 2022-12-29

## 2022-12-29 ENCOUNTER — APPOINTMENT (OUTPATIENT)
Dept: FAMILY MEDICINE | Facility: CLINIC | Age: 77
End: 2022-12-29
Payer: MEDICARE

## 2022-12-29 VITALS
OXYGEN SATURATION: 98 % | HEART RATE: 65 BPM | RESPIRATION RATE: 16 BRPM | BODY MASS INDEX: 21.97 KG/M2 | HEIGHT: 63 IN | TEMPERATURE: 98.2 F | SYSTOLIC BLOOD PRESSURE: 102 MMHG | WEIGHT: 124 LBS | DIASTOLIC BLOOD PRESSURE: 62 MMHG

## 2022-12-29 DIAGNOSIS — Z01.818 ENCOUNTER FOR OTHER PREPROCEDURAL EXAMINATION: ICD-10-CM

## 2022-12-29 LAB
ANION GAP SERPL CALC-SCNC: 12 MMOL/L
APTT BLD: 30.9 SEC
BASOPHILS # BLD AUTO: 0.02 K/UL
BASOPHILS NFR BLD AUTO: 0.6 %
BUN SERPL-MCNC: 25 MG/DL
CALCIUM SERPL-MCNC: 9.4 MG/DL
CHLORIDE SERPL-SCNC: 107 MMOL/L
CO2 SERPL-SCNC: 25 MMOL/L
CREAT SERPL-MCNC: 0.87 MG/DL
EGFR: 68 ML/MIN/1.73M2
EOSINOPHIL # BLD AUTO: 0.09 K/UL
EOSINOPHIL NFR BLD AUTO: 2.5 %
GLUCOSE SERPL-MCNC: 95 MG/DL
HCT VFR BLD CALC: 37.1 %
HGB BLD-MCNC: 12.3 G/DL
IMM GRANULOCYTES NFR BLD AUTO: 0 %
INR PPP: 0.95 RATIO
LYMPHOCYTES # BLD AUTO: 1.03 K/UL
LYMPHOCYTES NFR BLD AUTO: 28.9 %
MAN DIFF?: NORMAL
MCHC RBC-ENTMCNC: 33.2 GM/DL
MCHC RBC-ENTMCNC: 33.9 PG
MCV RBC AUTO: 102.2 FL
MONOCYTES # BLD AUTO: 0.38 K/UL
MONOCYTES NFR BLD AUTO: 10.7 %
NEUTROPHILS # BLD AUTO: 2.04 K/UL
NEUTROPHILS NFR BLD AUTO: 57.3 %
PLATELET # BLD AUTO: 201 K/UL
POTASSIUM SERPL-SCNC: 6.1 MMOL/L
PT BLD: 11 SEC
RBC # BLD: 3.63 M/UL
RBC # FLD: 14.9 %
SODIUM SERPL-SCNC: 143 MMOL/L
WBC # FLD AUTO: 3.56 K/UL

## 2022-12-29 PROCEDURE — 99214 OFFICE O/P EST MOD 30 MIN: CPT

## 2022-12-29 RX ORDER — CIPROFLOXACIN HYDROCHLORIDE 500 MG/1
500 TABLET, FILM COATED ORAL TWICE DAILY
Qty: 14 | Refills: 3 | Status: COMPLETED | COMMUNITY
Start: 2022-08-09 | End: 2022-12-29

## 2022-12-31 LAB
ANION GAP SERPL CALC-SCNC: 13 MMOL/L
BUN SERPL-MCNC: 15 MG/DL
CALCIUM SERPL-MCNC: 9.6 MG/DL
CHLORIDE SERPL-SCNC: 104 MMOL/L
CO2 SERPL-SCNC: 22 MMOL/L
CREAT SERPL-MCNC: 0.81 MG/DL
EGFR: 74 ML/MIN/1.73M2
GLUCOSE SERPL-MCNC: 90 MG/DL
POTASSIUM SERPL-SCNC: 4.9 MMOL/L
SODIUM SERPL-SCNC: 139 MMOL/L

## 2023-01-03 NOTE — PLAN
[FreeTextEntry1] : #Medical optimization \par -EKG in office: sinus arrhythmia no acute changes from previous EKG \par -Patient is medically optimized for planned procedure pending review of blood work\par \par \par Addendum: \par appreciated labs\par CBC stable (chronic leukocytosis), PT/PTT/INR wnl, initial BMP with potassium of 6.1, repeat BMP wnl  \par Patient is medically optimized for planned procedure. \par

## 2023-01-03 NOTE — ASSESSMENT
[Patient Optimized for Surgery] : Patient optimized for surgery [FreeTextEntry4] : 78 yo female PMH anxiety/depression, HLD, chronic leukopenia presenting today for medical optimization for face lift.

## 2023-01-03 NOTE — HISTORY OF PRESENT ILLNESS
[No Pertinent Cardiac History] : no history of aortic stenosis, atrial fibrillation, coronary artery disease, recent myocardial infarction, or implantable device/pacemaker [No Pertinent Pulmonary History] : no history of asthma, COPD, sleep apnea, or smoking [No Adverse Anesthesia Reaction] : no adverse anesthesia reaction in self or family member [(Patient denies any chest pain, claudication, dyspnea on exertion, orthopnea, palpitations or syncope)] : Patient denies any chest pain, claudication, dyspnea on exertion, orthopnea, palpitations or syncope [Chronic Anticoagulation] : no chronic anticoagulation [Chronic Kidney Disease] : no chronic kidney disease [Diabetes] : no diabetes [FreeTextEntry4] : 78 yo female PMH anxiety/depression, HLD, chronic leukopenia presenting today for medical optimization for face lift  scheduled for 1/11/23 with Dr. Serafin Sahrma. \par

## 2023-01-10 LAB — SARS-COV-2 N GENE NPH QL NAA+PROBE: NOT DETECTED

## 2023-01-25 ENCOUNTER — NON-APPOINTMENT (OUTPATIENT)
Age: 78
End: 2023-01-25

## 2023-01-25 ENCOUNTER — APPOINTMENT (OUTPATIENT)
Dept: CARDIOLOGY | Facility: CLINIC | Age: 78
End: 2023-01-25
Payer: MEDICARE

## 2023-01-25 ENCOUNTER — APPOINTMENT (OUTPATIENT)
Dept: FAMILY MEDICINE | Facility: CLINIC | Age: 78
End: 2023-01-25
Payer: MEDICARE

## 2023-01-25 VITALS
BODY MASS INDEX: 21.44 KG/M2 | HEIGHT: 63 IN | DIASTOLIC BLOOD PRESSURE: 68 MMHG | RESPIRATION RATE: 17 BRPM | SYSTOLIC BLOOD PRESSURE: 98 MMHG | OXYGEN SATURATION: 69 % | HEART RATE: 69 BPM | WEIGHT: 121 LBS | TEMPERATURE: 97.1 F

## 2023-01-25 VITALS
RESPIRATION RATE: 16 BRPM | TEMPERATURE: 95.7 F | WEIGHT: 121 LBS | BODY MASS INDEX: 20.91 KG/M2 | HEART RATE: 67 BPM | SYSTOLIC BLOOD PRESSURE: 108 MMHG | HEIGHT: 63.75 IN | DIASTOLIC BLOOD PRESSURE: 60 MMHG | OXYGEN SATURATION: 96 %

## 2023-01-25 DIAGNOSIS — R00.2 PALPITATIONS: ICD-10-CM

## 2023-01-25 PROCEDURE — 99204 OFFICE O/P NEW MOD 45 MIN: CPT

## 2023-01-25 PROCEDURE — 93000 ELECTROCARDIOGRAM COMPLETE: CPT

## 2023-01-25 PROCEDURE — 99213 OFFICE O/P EST LOW 20 MIN: CPT

## 2023-01-25 NOTE — HISTORY OF PRESENT ILLNESS
[FreeTextEntry1] : palpations  [de-identified] : 78 yo female PMH anxiety/depression, HLD, insomnia, chronic leukopenia presenting today for palpitations. \par For the past few weeks, a few times a day - she feels a "flutter in her chest." When it occurs, states that it takes her " a second to catch her breath." Denies chest pain, fevers. \par Of note,  she did have face lift surgery at the end of December, states the symptoms started afterwards. \par She plans to go away on a trip on 2/4/23. \par

## 2023-01-25 NOTE — PLAN
[FreeTextEntry1] : #Palpations \par -EKG in office NSR \par -follow-up blood work \par -follow-up with cardiology -referral provided

## 2023-01-25 NOTE — ASSESSMENT
[FreeTextEntry1] : 76 yo female PMH anxiety/depression, HLD, insomnia, chronic leukopenia presenting today for palpitations.

## 2023-01-27 ENCOUNTER — APPOINTMENT (OUTPATIENT)
Dept: CARDIOLOGY | Facility: CLINIC | Age: 78
End: 2023-01-27
Payer: MEDICARE

## 2023-01-27 PROCEDURE — 93306 TTE W/DOPPLER COMPLETE: CPT

## 2023-01-30 PROBLEM — R00.2 PALPITATION: Status: ACTIVE | Noted: 2023-01-25

## 2023-01-30 NOTE — DISCUSSION/SUMMARY
[FreeTextEntry1] : I have asked Shania to undergo detailed cardiac testing in order to evaluate her overall cardiovascular risk.An assessment of both structural and functional heart disease was recommended to the patient.\par In this regard, an echocardiogram and a stress test and Holter monitor were advised to the patient.I await the upcoming noninvasive cardiac testing in order to assess her overall cardiovascular risk.\par We discussed the pros and cons of plain treadmill stress testing nuclear stress testing and angiography including a sensitivity analysis.We discussed current ACC guidelines and the calculated 10 year risk is approximately   6% which is low\par \par More than half of the face to face encounter of 60 minutes   was spent in counseling the patient with respect to  cardiovascular risk\par \par Quality measures \par Tobacco intervention not indicated\par Statin for prevention of cardiovascular disease borderline indicated\par Hypertension compensated\par Aspirin for ischemic vascular disease not indicated\par Tobacco screening cessation and intervention not indicated\par \par Medical necessity\par This is a high encounter based upon two or more chronic illnesses with mild exacerbation requiring further management and evaluation.   .\par \par EKG is indicated for evaluation of palpitations\par \par risks benefits alternatives were discussed with the patient.\par all questions were answered to their satisfaction.\par

## 2023-01-30 NOTE — REASON FOR VISIT
[Symptom and Test Evaluation] : symptom and test evaluation [CV Risk Factors and Non-Cardiac Disease] : CV risk factors and non-cardiac disease [Arrhythmia/ECG Abnorrmalities] : arrhythmia/ECG abnormalities [Coronary Artery Disease] : coronary artery disease [FreeTextEntry3] : Dr. Concepcion [FreeTextEntry1] : Shikha reports palpitations of the last 2 to 3 times per day. She's noted dizziness.She noted symptoms while moving the couch. She had recent cosmetic facial surgery.She defers a stress test given concern over recent cosmetic facial surgery. She plans a trip to [place] and request evaluation prior to a planned . She  was advised to undergo a complete cardiac evaluation. She denies chest pains shortness of breath or loss of consciousnes.\par

## 2023-02-01 ENCOUNTER — NON-APPOINTMENT (OUTPATIENT)
Age: 78
End: 2023-02-01

## 2023-02-01 PROCEDURE — 93224 XTRNL ECG REC UP TO 48 HRS: CPT

## 2023-02-01 NOTE — PHYSICAL THERAPY INITIAL EVALUATION ADULT - NAME OF DISCHARGE PLANNER
Nutrition Assessment   Reason for Consult/Assessment: Follow upRD Review     Diagnosis and Hx: Reviewed (past medical history significant for ETOH abuse, alcoholic liver disease, chronic hyponatremia, HTN)    Pertinent Nutrition History: Patient more alert during visit today, says he did not eat for four days prior to admission due to alcohol intake. Prior to this he says he was eating okay, had two meals daily. When asked for an example meal he says he would have a burger that he would cook on the stove. Per SW note patient reported not having a microwave and that he would leave frozen meals out to defrost until he was able to eat it, however patient did not share this with writer today.    Pertinent Nutrition Information: 2/1 labs: PO4 now WDL, Mag low (1.4), K WDL, Na low (128). Folic acid and thiamine being given. Mag supplemented. No BM yet this admission per charting and per patient report, monitor and consider bowel regimen as needed. Patient denies any abdominal discomfort, bloating or nausea at this time.                                 Diet Order: Enteral nutrition/tube feeding, Dysphagia, Oral nutrition supplement/snacks      Enteral nutrition/tube feeding: Tube feeding discontinued  Nutrition supplement/snacks: Will order one strawberry Ensure Plus HP BID with meals per patient agreement   Dysphagia: Diet advanced from Dyshphagia/Mechanical Soft Diet to Easy Chew Diet today per SLP    Diet tolerance: Tolerating with good appetite/intakes recorded (Consuming % of meals, reports good appetite and meal tolerance so far)   Food Allergies: None known    Demographic/Anthropometrics Information  Gender: male   Patient Age: 59 year old  Height:   Ht Readings from Last 1 Encounters:   01/28/23 5' 9\" (1.753 m)      Weight:   Wt Readings from Last 1 Encounters:   02/01/23 63.3 kg      BMI:   BMI Readings from Last 1 Encounters:   02/01/23 20.61 kg/m²          % Weight Change: 3.7 kg (5.7%) weight loss x 1.5  months prior to admission           Estimated Needs:  Calculated Energy Needs: 1734-8091  kcal               Calculated protein needs: 74-93  g    Calculated Fluid Needs: 1ml/kcal              NFPE  Nutrition Focused Physical Exam  Physical Exam Completed: Yes (Limited exam completed due to wires, ect and patient ability to participate)  Body Fat  Orbital Region: Mild/Moderate  Upper Arm Region (Triceps/biceps): Unable to assess  Muscle Mass  Collarbone Region (Clavicle Bone, Pectoralis Major, Trapezius Muscle): Mild/Moderate  Hand Region: Mild/Moderate  Patellar Region (Quadriceps Muscle): Mild/Moderate  Anterior Thigh Region (Quadriceps Muscle): Mild/Moderate  Posterior Calf Region (Gastrocnemius Muscle): Unable to assess  Skin Assessment/Wounds  Wounds Present:  (per 2/1 wound care RN note: \"There are no open wounds or pressures injuries present to hips at this time\")             TREATMENT PLAN: Monitoring & Interventions   Intervention: Enteral nutrition, Meals and snacks, Nutrition supplement therapy      Meals & snacks: Encourage balanced meal intakes TID     Enteral Nutrition Formula: Osmolite 1.5 Calorie - TF discontinued                                   Nutrition supplement therapy: Trial Ensure Plus HP       Goal: Achieve normal electrolytes, Meet >/equal 75% estimated needs, Tolerate oral diet   Intervention goal status: New goal identified  Time frame to achieve goal: Ongoing     Dietitian will monitor: Biochemical data, medical tests, procedures, Enteral nutrition intake, Food, beverage, and nutrient intake, Diet advancement      Nutrition Diagnosis / PES  Nutrition Diagnosis: Inadequate oral intake  Related to: Other (comments)alcohol abuse, decreased appetite, AMS  As evidenced by: Diet history/recall, Loss of muscle mass, Weight loss over time      Primary Nutrition Diagnosis status: Active nutrition diagnosis                    via [employer] communication

## 2023-02-02 ENCOUNTER — APPOINTMENT (OUTPATIENT)
Dept: CARDIOLOGY | Facility: CLINIC | Age: 78
End: 2023-02-02
Payer: MEDICARE

## 2023-02-02 ENCOUNTER — NON-APPOINTMENT (OUTPATIENT)
Age: 78
End: 2023-02-02

## 2023-02-02 PROCEDURE — 99214 OFFICE O/P EST MOD 30 MIN: CPT | Mod: 25

## 2023-02-02 PROCEDURE — 93015 CV STRESS TEST SUPVJ I&R: CPT

## 2023-02-12 NOTE — DISCUSSION/SUMMARY
[FreeTextEntry1] : I have asked Shania to undergo detailed cardiac testing in order to evaluate her overall cardiovascular risk.An assessment of both structural and functional heart disease was recommended to the patient.\par In this regard, an echocardiogram and a stress test and Holter monitor were advised to the patient.I await the upcoming noninvasive cardiac testing in order to assess her overall cardiovascular risk.\par We discussed the pros and cons of plain treadmill stress testing nuclear stress testing and angiography including a sensitivity analysis.We discussed current ACC guidelines and the calculated 10 year risk is approximately   6% which is low\par \par More than half of the face to face encounter of 60 minutes   was spent in counseling the patient with respect to  cardiovascular risk\par \par Addendum 2/2/23\par we reviewd an 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 we discussed options including calcium channel blocker or beta-blocker and even radiofrequency ablation. she  will engage in lifestyle modification in the meantime avoid alcohol caffeine tobacco. LDL cholesterol extremely elevated and would consider a statin such as atorvastatin 10 mg daily given LDL greater than 160. a repeat lipid liver profile in 6 weeks.  Or sooner.  Patient plans trip to [place].  Reviewed stress test along with limitations with patient.\par \par Quality measures \par Tobacco intervention not indicated\par Statin for prevention of cardiovascular disease borderline indicated patient begun on atorvastatin 10\par Hypertension compensated\par Aspirin for ischemic vascular disease not indicated\par Tobacco screening cessation and intervention not indicated\par \par Medical necessity\par This is a high encounter based upon two or more chronic illnesses with mild exacerbation requiring further management and evaluation.   .\par \par EKG is indicated for evaluation of palpitations\par \par risks benefits alternatives were discussed with the patient.\par all questions were answered to their satisfaction.\par

## 2023-02-12 NOTE — REASON FOR VISIT
[FreeTextEntry3] : Dr. Concepcion [FreeTextEntry1] : Shikha reports palpitations of the last 2 to 3 times per day. She's noted dizziness.She noted symptoms while moving the couch. She had recent cosmetic facial surgery.She defers a stress test given concern over recent cosmetic facial surgery. She plans a trip to [place] and request evaluation prior to a planned . She  was advised to undergo a complete cardiac evaluation. She denies chest pains shortness of breath or loss of consciousnes.\par

## 2023-02-27 LAB
ALBUMIN SERPL ELPH-MCNC: 4.2 G/DL
ALP BLD-CCNC: 61 U/L
ALT SERPL-CCNC: 9 U/L
ANION GAP SERPL CALC-SCNC: 12 MMOL/L
AST SERPL-CCNC: 24 U/L
BILIRUB SERPL-MCNC: 0.8 MG/DL
BUN SERPL-MCNC: 21 MG/DL
CALCIUM SERPL-MCNC: 9.6 MG/DL
CHLORIDE SERPL-SCNC: 103 MMOL/L
CHOLEST SERPL-MCNC: 186 MG/DL
CO2 SERPL-SCNC: 24 MMOL/L
CREAT SERPL-MCNC: 0.86 MG/DL
EGFR: 69 ML/MIN/1.73M2
GLUCOSE SERPL-MCNC: 82 MG/DL
HDLC SERPL-MCNC: 72 MG/DL
LDLC SERPL CALC-MCNC: 99 MG/DL
NONHDLC SERPL-MCNC: 114 MG/DL
POTASSIUM SERPL-SCNC: 5.4 MMOL/L
PROT SERPL-MCNC: 6.7 G/DL
SODIUM SERPL-SCNC: 139 MMOL/L
TRIGL SERPL-MCNC: 75 MG/DL

## 2023-02-28 ENCOUNTER — NON-APPOINTMENT (OUTPATIENT)
Age: 78
End: 2023-02-28

## 2023-02-28 ENCOUNTER — APPOINTMENT (OUTPATIENT)
Dept: CARDIOLOGY | Facility: CLINIC | Age: 78
End: 2023-02-28
Payer: MEDICARE

## 2023-02-28 VITALS
HEIGHT: 63 IN | RESPIRATION RATE: 17 BRPM | BODY MASS INDEX: 21.62 KG/M2 | WEIGHT: 122 LBS | TEMPERATURE: 97 F | OXYGEN SATURATION: 98 % | SYSTOLIC BLOOD PRESSURE: 93 MMHG | HEART RATE: 67 BPM | DIASTOLIC BLOOD PRESSURE: 63 MMHG

## 2023-02-28 DIAGNOSIS — I65.23 OCCLUSION AND STENOSIS OF BILATERAL CAROTID ARTERIES: ICD-10-CM

## 2023-02-28 PROCEDURE — 93000 ELECTROCARDIOGRAM COMPLETE: CPT

## 2023-02-28 PROCEDURE — 99214 OFFICE O/P EST MOD 30 MIN: CPT

## 2023-03-14 ENCOUNTER — APPOINTMENT (OUTPATIENT)
Dept: CARDIOLOGY | Facility: CLINIC | Age: 78
End: 2023-03-14
Payer: MEDICARE

## 2023-03-14 PROCEDURE — 93880 EXTRACRANIAL BILAT STUDY: CPT

## 2023-03-16 ENCOUNTER — NON-APPOINTMENT (OUTPATIENT)
Age: 78
End: 2023-03-16

## 2023-03-16 PROBLEM — I65.23 ATHEROSCLEROSIS OF BOTH CAROTID ARTERIES: Status: ACTIVE | Noted: 2023-02-28

## 2023-03-16 NOTE — DISCUSSION/SUMMARY
[EKG obtained to assist in diagnosis and management of assessed problem(s)] : EKG obtained to assist in diagnosis and management of assessed problem(s) [FreeTextEntry1] : I have asked Shania to undergo detailed cardiac testing in order to evaluate her overall cardiovascular risk.An assessment of both structural and functional heart disease was recommended to the patient.\par In this regard, an echocardiogram and a stress test and Holter monitor were advised to the patient.I await the upcoming noninvasive cardiac testing in order to assess her overall cardiovascular risk.\par We discussed the pros and cons of plain treadmill stress testing nuclear stress testing and angiography including a sensitivity analysis.We discussed current ACC guidelines and the calculated 10 year risk is approximately   6% which is low\par \par More than half of the face to face encounter of 60 minutes   was spent in counseling the patient with respect to  cardiovascular risk\par \par Addendum 2/2/23\par we review an 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 we discussed options including calcium channel blocker or beta-blocker and even radiofrequency ablation. she  will engage in lifestyle modification in the meantime avoid alcohol caffeine tobacco. LDL cholesterol extremely elevated and would consider a statin such as atorvastatin 10 mg daily given LDL greater than 160. a repeat lipid liver profile in 6 weeks.  Or sooner.  Patient plans trip to [place].  Reviewed stress test along with limitations with patient.\par \par 2/28/23\par a caortid sonogram is planned and perfromed and reveals mild cartoid disease. would consider a low dose aspirin 81 mg\par \par Quality measures \par Tobacco intervention not indicated\par Statin for prevention of cardiovascular disease borderline indicated patient begun on atorvastatin 10\par Hypertension compensated\par Aspirin for ischemic vascular disease not indicated\par Tobacco screening cessation and intervention not indicated\par \par Medical necessity\par This is a high encounter based upon two or more chronic illnesses with mild exacerbation requiring further drug management and evaluation.   .\par \par EKG is indicated for evaluation of palpitations\par \par risks benefits alternatives were discussed with the patient.\par all questions were answered to their satisfaction.

## 2023-03-16 NOTE — REASON FOR VISIT
[Symptom and Test Evaluation] : symptom and test evaluation [CV Risk Factors and Non-Cardiac Disease] : CV risk factors and non-cardiac disease [Arrhythmia/ECG Abnorrmalities] : arrhythmia/ECG abnormalities [Coronary Artery Disease] : coronary artery disease [FreeTextEntry3] : Dr. Concepcion [FreeTextEntry1] : Shikha reports palpitations of the last 2 to 3 times per day. She's noted dizziness.She noted symptoms while moving the couch. She had recent cosmetic facial surgery.She defers a stress test given concern over recent cosmetic facial surgery. She plans a trip to [place] and request evaluation prior to a planned . She  was advised to undergo a complete cardiac evaluation. She denies chest pains shortness of breath or loss of consciousnes.\par \par 2/28/23\par Shikha returns today after a recent [place] trip and a carotid sonogram is requested given concern over microvascular risk and peripheral arterial disease.

## 2023-03-17 ENCOUNTER — NON-APPOINTMENT (OUTPATIENT)
Age: 78
End: 2023-03-17

## 2023-04-12 ENCOUNTER — APPOINTMENT (OUTPATIENT)
Dept: RADIOLOGY | Facility: HOSPITAL | Age: 78
End: 2023-04-12
Payer: MEDICARE

## 2023-04-12 ENCOUNTER — OUTPATIENT (OUTPATIENT)
Dept: OUTPATIENT SERVICES | Facility: HOSPITAL | Age: 78
LOS: 1 days | End: 2023-04-12
Payer: MEDICARE

## 2023-04-12 DIAGNOSIS — Z90.89 ACQUIRED ABSENCE OF OTHER ORGANS: Chronic | ICD-10-CM

## 2023-04-12 DIAGNOSIS — Z98.890 OTHER SPECIFIED POSTPROCEDURAL STATES: Chronic | ICD-10-CM

## 2023-04-12 DIAGNOSIS — Z98.891 HISTORY OF UTERINE SCAR FROM PREVIOUS SURGERY: Chronic | ICD-10-CM

## 2023-04-12 DIAGNOSIS — Z00.8 ENCOUNTER FOR OTHER GENERAL EXAMINATION: ICD-10-CM

## 2023-04-12 PROCEDURE — 73630 X-RAY EXAM OF FOOT: CPT

## 2023-04-12 PROCEDURE — 73630 X-RAY EXAM OF FOOT: CPT | Mod: 26,RT

## 2023-04-19 NOTE — ED ADULT NURSE NOTE - CAS TRG GENERAL NORM CIRC DET
Peripheral Block    Patient location during procedure: pre-op  Reason for block: procedure for pain, post-op pain management and at surgeon's request  Start time: 4/19/2023 10:18 AM  End time: 4/19/2023 10:23 AM  Staffing  Performed: anesthesiologist   Anesthesiologist: Marbella Neely MD  Preanesthetic Checklist  Completed: patient identified, IV checked, site marked, risks and benefits discussed, surgical/procedural consents, equipment checked, pre-op evaluation, timeout performed, anesthesia consent given, oxygen available, monitors applied/VS acknowledged, fire risk safety assessment completed and verbalized and blood product R/B/A discussed and consented  Peripheral Block   Patient position: supine  Prep: ChloraPrep  Provider prep: sterile gloves, sterile gown and mask  Patient monitoring: continuous pulse ox, frequent blood pressure checks, responsive to questions and oxygen  Block type: Femoral  Adductor canal  Laterality: right  Injection technique: single-shot  Guidance: ultrasound guided    Needle   Needle type: insulated echogenic nerve stimulator needle   Needle gauge: 21 G  Needle localization: ultrasound guidance  Needle insertion depth: 8 cm  Needle length: 10 cm  Assessment   Injection assessment: negative aspiration for heme, no paresthesia on injection, local visualized surrounding nerve on ultrasound and no intravascular symptoms  Paresthesia pain: none  Slow fractionated injection: yes  Hemodynamics: stable  Real-time US image taken/store: yes  Outcomes: uncomplicated    Medications Administered  mepivacaine (CARBOCAINE) injection 1.5% - Perineural   10 mL - 4/19/2023 10:23:00 AM  ropivacaine (NAROPIN) injection 0.5% - Perineural   20 mL - 4/19/2023 10:23:00 AM Strong peripheral pulses/Capillary refill less/equal to 2 seconds

## 2023-04-27 NOTE — ED ADULT TRIAGE NOTE - CCCP TRG CHIEF CMPLNT
injury, hip Ivermectin Counseling:  Patient instructed to take medication on an empty stomach with a full glass of water.  Patient informed of potential adverse effects including but not limited to nausea, diarrhea, dizziness, itching, and swelling of the extremities or lymph nodes.  The patient verbalized understanding of the proper use and possible adverse effects of ivermectin.  All of the patient's questions and concerns were addressed.

## 2023-05-03 ENCOUNTER — APPOINTMENT (OUTPATIENT)
Dept: NEUROLOGY | Facility: CLINIC | Age: 78
End: 2023-05-03
Payer: MEDICARE

## 2023-05-03 VITALS
WEIGHT: 122 LBS | OXYGEN SATURATION: 96 % | HEIGHT: 63 IN | BODY MASS INDEX: 21.62 KG/M2 | RESPIRATION RATE: 16 BRPM | HEART RATE: 76 BPM | DIASTOLIC BLOOD PRESSURE: 75 MMHG | SYSTOLIC BLOOD PRESSURE: 111 MMHG | TEMPERATURE: 97 F

## 2023-05-03 DIAGNOSIS — Z82.3 FAMILY HISTORY OF STROKE: ICD-10-CM

## 2023-05-03 DIAGNOSIS — F41.9 ANXIETY DISORDER, UNSPECIFIED: ICD-10-CM

## 2023-05-03 DIAGNOSIS — R53.82 CHRONIC FATIGUE, UNSPECIFIED: ICD-10-CM

## 2023-05-03 PROCEDURE — 99204 OFFICE O/P NEW MOD 45 MIN: CPT

## 2023-05-03 NOTE — ASSESSMENT
[FreeTextEntry1] : Impression: This 78-year-old right-handed female with a history of chronic anxiety and insomnia presents with fatigue and excessive daytime sleepiness.  Today's neurological exam is normal.  Rule out an underlying sleep disorder such as KEITH.  Doubt other central or peripheral nervous system dysfunction.\par \par Recommendations: I did suggest behavior modification for sleep disorder.  Avoid caffeine and alcohol.  Consider referral for sleep study which she declines at this juncture.  Consider medication trials with melatonin or trazodone which she declines at this juncture.  Defer further neurological work-up.\par

## 2023-05-03 NOTE — PHYSICAL EXAM
[FreeTextEntry1] : Head:  Normocephalic Neck: Supple nontender no carotid bruits. \par \par Mental Status:  Alert Oriented X3 Speech normal and no aphasia or dysarthria.\par \par Cranial Nerves:  PERRL, Fundi normal Visual Fields full  EOMI no diplopia no ptosis no nystagmus, V through XII intact.\par \par Motor:  No drift, normal strength tone and coordination and no focal atrophy. No abnormal movements. No dysmetria.  Normal rapid alternating movements. \par \par DTRs: Symmetric 1-2+.  Plantars flexor.  No Clonus.\par \par Sensory:  Normal testing with pin light touch and vibration and  Joint position sense.  Normal DSS to touch.\par \par Gait:  Normal including tandem walking heel toe walking and Rhomberg.\par

## 2023-05-03 NOTE — HISTORY OF PRESENT ILLNESS
[FreeTextEntry1] : This patient is seen for an office consultation.  She is a 78-year-old right-handed woman with chronic insomnia and anxiety who describes fatigue and excessive daytime sleepiness.  She naps during the day.  Her insomnia is chronic and her sleep is irregular.  She meditates in order to fall asleep and will occasionally awaken during the night.  She takes chronic Prozac 10 mg in the AM.  She is not taking any medication for sleep.  Symptoms may have exacerbated during the COVID pandemic.  She denies excessive snoring.  She has not had a work-up for sleep apnea.\par \par She recently saw her cardiologist for palpitations.  She was started on low-dose statin medication and is having a work-up.\par \par She denies headache dizziness syncope seizure visual speech cognitive difficulties.  She denies any motor or sensory complaints.\par \par She is a non-smoker and does not use excessive alcohol.\par \par Is a family history of stroke and CAD in her father and she has a son with Susac syndrome.\par \par

## 2023-05-08 ENCOUNTER — NON-APPOINTMENT (OUTPATIENT)
Age: 78
End: 2023-05-08

## 2023-05-08 ENCOUNTER — APPOINTMENT (OUTPATIENT)
Dept: ORTHOPEDIC SURGERY | Facility: CLINIC | Age: 78
End: 2023-05-08
Payer: MEDICARE

## 2023-05-08 DIAGNOSIS — M77.51 OTHER ENTHESOPATHY OF RT FOOT AND ANKLE: ICD-10-CM

## 2023-05-08 DIAGNOSIS — M20.21 HALLUX RIGIDUS, RIGHT FOOT: ICD-10-CM

## 2023-05-08 PROCEDURE — 99204 OFFICE O/P NEW MOD 45 MIN: CPT

## 2023-05-08 NOTE — HISTORY OF PRESENT ILLNESS
[FreeTextEntry1] : The patient is a 78 year old female presenting for an initial visit of right foot pain. The patient is here for evaluation for her right great toe pain, chronic. She states that currently today in office she is not experiencing pain. She is not walking with a limp. She presents today wearing sneakers and is ambulating without assistance. She is currently not taking any medication for treatment of pain.  dyspnea

## 2023-05-08 NOTE — ADDENDUM
[FreeTextEntry1] : I, TATI GALEAS, acted solely as a scribe for Dr. Lm Zhang on this date 05/08/2023  .\par  \par All medical record entries made by the Scribe were at my, Dr. Lm Zhang, direction and personally dictated by me on 05/08/2023 . I have reviewed the chart and agree that the record accurately reflects my personal performance of the history, physical exam, assessment and plan. I have also personally directed, reviewed, and agreed with the chart.

## 2023-05-08 NOTE — PHYSICAL EXAM
[de-identified] : General: Alert and oriented x3. In no acute distress. Pleasant in nature with a normal affect. No apparent respiratory distress.\par \par Right Foot Exam\par \par Skin: Clean, dry, intact\par Inspection: Dorsal Prominence at base of 1st digit, No obvious malalignment, no masses, no swelling, no effusion\par Pulses: 2+ DP/PT pulses\par \par ROM: FOOT Full ROM of digits, ANKLE 10 degrees of dorsiflexion, 20 degrees of plantarflexion, 35 degress of inversion, 35 degrees of eversion,10 degrees of subtalar motion.\par Painful ROM: None\par Tenderness: No tenderness over the medial malleolus, No tenderness over the lateral malleolus, no CFL/ATFL/PTFL pain, no deltoid ligament pain. No heel pain. No Achilles tenderness. No 5th metatarsal pain. No pain to the LisFranc joint. No ttp over the posterior tibial tendon.\par Stability: Negative anterior/posterior drawer.\par Strength: 5/5 ADD/ABD/TA/GS/EHL/FHL/EDL\par Neuro: Sensation in tact to light touch throughout\par Additional tests: Negative Mortons test, negative tarsal tunnel tinels, negative single heel rise.\par  [de-identified] : Xrays of right foot obtained from outside facility on 4/12/2023 and reviewed in the office today, 05/08/2023 , revealed: Hallux Rigidus, osteophytes at base of great toe. \par \par \par ACC: [id number] EXAM: XR FOOT COMP MIN 3 VIEWS RT ORDERED BY: AMANDA BUTTERFIELD\par \par PROCEDURE DATE: 04/12/2023\par \par \par \par INTERPRETATION: CLINICAL INDICATION: right foot pain\par \par EXAM:\par Frontal oblique lateral right foot from 4/12/2023 at 0840. 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 Preserved remaining visualized joint spaces and no joint margin erosions.\par \par No lytic or blastic lesions.\par \par --- End of Report ---\par \par \par \par \par \par ANGEL PEREZ MD; Attending Radiologist\par This document has been electronically signed. Apr 12 2023 10:07AM

## 2023-05-25 ENCOUNTER — APPOINTMENT (OUTPATIENT)
Dept: CT IMAGING | Facility: CLINIC | Age: 78
End: 2023-05-25

## 2023-05-25 ENCOUNTER — APPOINTMENT (OUTPATIENT)
Dept: CT IMAGING | Facility: CLINIC | Age: 78
End: 2023-05-25
Payer: MEDICARE

## 2023-05-25 ENCOUNTER — OUTPATIENT (OUTPATIENT)
Dept: OUTPATIENT SERVICES | Facility: HOSPITAL | Age: 78
LOS: 1 days | End: 2023-05-25
Payer: MEDICARE

## 2023-05-25 DIAGNOSIS — R00.2 PALPITATIONS: ICD-10-CM

## 2023-05-25 DIAGNOSIS — Z98.890 OTHER SPECIFIED POSTPROCEDURAL STATES: Chronic | ICD-10-CM

## 2023-05-25 DIAGNOSIS — Z90.89 ACQUIRED ABSENCE OF OTHER ORGANS: Chronic | ICD-10-CM

## 2023-05-25 DIAGNOSIS — Z98.891 HISTORY OF UTERINE SCAR FROM PREVIOUS SURGERY: Chronic | ICD-10-CM

## 2023-05-25 PROCEDURE — 75574 CT ANGIO HRT W/3D IMAGE: CPT

## 2023-05-25 PROCEDURE — 75574 CT ANGIO HRT W/3D IMAGE: CPT | Mod: 26,MH

## 2023-06-06 DIAGNOSIS — R91.1 SOLITARY PULMONARY NODULE: ICD-10-CM

## 2023-06-07 ENCOUNTER — NON-APPOINTMENT (OUTPATIENT)
Age: 78
End: 2023-06-07

## 2023-08-14 NOTE — ED ADULT NURSE NOTE - NS TRANSFER PATIENT BELONGINGS
Lab Results   Component Value Date    LABA1C 6.6 (H) 03/11/2022    LABA1C 5.8 06/16/2021    LABA1C 6.5 (H) 12/02/2020
MY CHART MESSAGE SENT TO PT
Needs appointment
Please Approve or Refuse.   Send to Pharmacy per Pt's Request:      Next Visit Date:  Visit date not found   Last Visit Date: 11/3/2022    Hemoglobin A1C (%)   Date Value   03/11/2022 6.6 (H)   06/16/2021 5.8   12/02/2020 6.5 (H)             ( goal A1C is < 7)   BP Readings from Last 3 Encounters:   10/29/22 113/70   11/04/21 126/76   02/05/21 123/61          (goal 120/80)  BUN   Date Value Ref Range Status   10/29/2022 21 (H) 6 - 20 mg/dL Final     Creatinine   Date Value Ref Range Status   10/29/2022 1.10 0.70 - 1.20 mg/dL Final     Potassium   Date Value Ref Range Status   10/29/2022 4.8 3.7 - 5.3 mmol/L Final
Pt called in stating he recently was released from alf and is out of his medications. Pt did reschedule his Physical from last year and is inquiring if he can get his meds refilled prior to appt. Please Approve or Refuse.   Send to Pharmacy per Pt's Request:      Next Visit Date:  8/16/2023   Last Visit Date: 11/3/2022    Hemoglobin A1C (%)   Date Value   03/11/2022 6.6 (H)   06/16/2021 5.8   12/02/2020 6.5 (H)             ( goal A1C is < 7)   BP Readings from Last 3 Encounters:   10/29/22 113/70   11/04/21 126/76   02/05/21 123/61          (goal 120/80)  BUN   Date Value Ref Range Status   10/29/2022 21 (H) 6 - 20 mg/dL Final     Creatinine   Date Value Ref Range Status   10/29/2022 1.10 0.70 - 1.20 mg/dL Final     Potassium   Date Value Ref Range Status   10/29/2022 4.8 3.7 - 5.3 mmol/L Final
Cell Phone/PDA (specify)/Clothing

## 2023-08-30 ENCOUNTER — EMERGENCY (EMERGENCY)
Facility: HOSPITAL | Age: 78
LOS: 1 days | Discharge: HOME | End: 2023-08-30
Attending: EMERGENCY MEDICINE | Admitting: EMERGENCY MEDICINE
Payer: MEDICARE

## 2023-08-30 ENCOUNTER — NON-APPOINTMENT (OUTPATIENT)
Age: 78
End: 2023-08-30

## 2023-08-30 ENCOUNTER — APPOINTMENT (OUTPATIENT)
Dept: ORTHOPEDIC SURGERY | Facility: CLINIC | Age: 78
End: 2023-08-30
Payer: MEDICARE

## 2023-08-30 VITALS
DIASTOLIC BLOOD PRESSURE: 71 MMHG | WEIGHT: 125 LBS | HEART RATE: 81 BPM | HEIGHT: 64 IN | SYSTOLIC BLOOD PRESSURE: 108 MMHG | BODY MASS INDEX: 21.34 KG/M2

## 2023-08-30 VITALS
HEART RATE: 80 BPM | RESPIRATION RATE: 16 BRPM | HEIGHT: 64 IN | TEMPERATURE: 98 F | DIASTOLIC BLOOD PRESSURE: 71 MMHG | WEIGHT: 125 LBS | SYSTOLIC BLOOD PRESSURE: 106 MMHG | OXYGEN SATURATION: 97 %

## 2023-08-30 DIAGNOSIS — Z90.89 ACQUIRED ABSENCE OF OTHER ORGANS: Chronic | ICD-10-CM

## 2023-08-30 DIAGNOSIS — Z98.890 OTHER SPECIFIED POSTPROCEDURAL STATES: Chronic | ICD-10-CM

## 2023-08-30 DIAGNOSIS — Z98.891 HISTORY OF UTERINE SCAR FROM PREVIOUS SURGERY: Chronic | ICD-10-CM

## 2023-08-30 PROCEDURE — 73630 X-RAY EXAM OF FOOT: CPT | Mod: 26,LT

## 2023-08-30 PROCEDURE — 73630 X-RAY EXAM OF FOOT: CPT

## 2023-08-30 PROCEDURE — 28470 CLTX METATARSAL FX WO MNP EA: CPT | Mod: T4

## 2023-08-30 PROCEDURE — 99213 OFFICE O/P EST LOW 20 MIN: CPT | Mod: 57

## 2023-08-30 PROCEDURE — 99285 EMERGENCY DEPT VISIT HI MDM: CPT | Mod: FS

## 2023-08-30 PROCEDURE — 29515 APPLICATION SHORT LEG SPLINT: CPT | Mod: LT

## 2023-08-30 PROCEDURE — 99283 EMERGENCY DEPT VISIT LOW MDM: CPT | Mod: 25

## 2023-08-30 NOTE — ED PROVIDER NOTE - CLINICAL SUMMARY MEDICAL DECISION MAKING FREE TEXT BOX
pt 77 yo f c/o left foot pain after she twisted her foot walking down a step. didn't take any pain meds and doesn't need any. denies numbness, tingling, weakness. no head strike, no other injuries  fx, splinted and ortho fu given  pt also given walker  Discussed with patient need to return to ED if symptoms don't continue to improve or recur or develops any new or worsening symptoms that are of concern.

## 2023-08-30 NOTE — ED PROVIDER NOTE - NS ED ATTENDING STATEMENT MOD
This was a shared visit with the I[facility]. I reviewed and verified the documentation and independently performed the documented:

## 2023-08-30 NOTE — ED PROVIDER NOTE - PHYSICAL EXAMINATION
Gen: Well appearing in NAD  Head: NC/AT  Neck: trachea midline  Resp:  No distress  Ext: LLE: no ttp prox/distal tib/fib. TTP metatarsal 3-5 wo deformity, soft compartments, sensation intact, cap refill <3, able to range ankle and toes  Neuro:  A&O appears non focal  Skin:  ecchymosis left metatarsal 3-5  Psych:  Normal affect and mood

## 2023-08-30 NOTE — ED ADULT NURSE NOTE - OBJECTIVE STATEMENT
Patient BIB self for left ankle swelling/bruising after tripping off of high platform shoes while going down stairs yesterday afternoon. Patient took 1G tylenol at 10am today with good effect, though sent to ER after speaking with primary care Reyes. Patient with history of osteoporosis, depression on Prozac. Alert and oriented, HRR, respirations even and unlabored, Abdomen soft and nontender. No extremity swelling, mild ecchymosis noted to lateral side of left foot. Able to walk/weight bear with pain. No break in skin.

## 2023-08-30 NOTE — ED PROCEDURE NOTE - NS ED PERI NEURO NEG
Pre-application: Motor, sensory, and vascular responses intact in the injured extremity./Post-application: Motor, sensory, and vascular responses intact in the injured extremity. Pre-application: Motor, sensory, and vascular responses intact in the injured extremity./Post-application: Motor, sensory, and vascular responses intact in the injured extremity./The patient/caregiver verbalized understanding of how to care for the injured extremity with splint

## 2023-08-30 NOTE — ED PROVIDER NOTE - PATIENT PORTAL LINK FT
You can access the FollowMyHealth Patient Portal offered by [facility] by registering at the following website: [email or web address]. By joining [employer]’s FollowMyHealth portal, you will also be able to view your health information using other applications (apps) compatible with our system.

## 2023-08-30 NOTE — ED PROVIDER NOTE - NSFOLLOWUPINSTRUCTIONS_ED_ALL_ED_FT
Follow up with the orthopedist today as scheduled  Rest, ice and elevate.  Please use 650mg tylenol (or acetaminophen) every 6 hours and 600mg motrin (or advil or ibuprofen) every 6 hours as needed for pain/discomfort/swelling.  Make sure not to use more than 3500mg in any 24 hour period.   Ambulate as tolerated using crutch assistance.   Worsening pain, swelling, numbness, weakness return to ER

## 2023-08-30 NOTE — PHYSICAL EXAM
[de-identified] : General: No apparent distress Cardiovascular: extremities warm and well-perfused, no cyanosis Pulmonary: non labored respirations  Left foot: Tenderness to palpation, swelling and ecchymosis about base of fifth metatarsal Fires EHL/FHL/tib ant/gastrocsoleus Sensation intact light touch in sural/saphenous/superficial peroneal/deep peroneal/tibial distributions DP pulse 2+ [de-identified] : X-rays taken in the ER today of the left foot demonstrate nondisplaced fifth metatarsal base fracture

## 2023-08-30 NOTE — DISCUSSION/SUMMARY
[de-identified] : Left nondisplaced fifth metatarsal base fracture.  I discussed with her that since her fracture is nondisplaced, I recommend nonoperative treatment.  She is provided with a cam boot today which she should wear whenever she is ambulating.  She may be weightbearing as tolerated and should be mobile.  Whenever she is not mobilizing, she should ice and elevate the foot to help with pain and swelling.  She may take Tylenol and ibuprofen for pain control.  Discussed that fracture healing can take 3 months or longer.  I will see her back in 4 weeks for repeat evaluation.  She should avoid any high impact activities for the time being.  The patient expressed understanding of the diagnosis and treatment plan and all questions were answered.  This note was generated using dragon medical dictation software.  A reasonable effort has been made for proofreading its contents, but typos may still remain.  If there are any questions or points of clarification needed please notify my office.

## 2023-08-30 NOTE — HISTORY OF PRESENT ILLNESS
[de-identified] : PJ FLOWER  is a 78 year old F who presents with a left foot injury.  She says that yesterday she was walking downstairs and she twisted her left ankle.  She says that she had difficulty walking.  She developed pain and swelling overnight so she went to the ER today.  X-rays were taken and she was placed into a splint.

## 2023-08-30 NOTE — ED ADULT NURSE NOTE - NSFALLUNIVINTERV_ED_ALL_ED
Bed/Stretcher in lowest position, wheels locked, appropriate side rails in place/Call bell, personal items and telephone in reach/Instruct patient to call for assistance before getting out of bed/chair/stretcher/Non-slip footwear applied when patient is off stretcher/[place] to call system/Physically safe environment - no spills, clutter or unnecessary equipment/Purposeful proactive rounding/Room/bathroom lighting operational, light cord in reach

## 2023-08-30 NOTE — CHART NOTE - NSCHARTNOTEFT_GEN_A_CORE
SW consult placed to assist patient with orthopedist follow up appointment.  SW met with patient at bedside, introduced self and role of SW.  Patient is a 78 year old female presenting to ED due to ankle pain. SW spoke with patient regarding availability in [place] and [place] offices of [facility] Orthopedist. Patient requesting appointment for today.  Referral coordinator made aware and appointment secured for today, 8/30/23 at 2:30 PM with Dr Garrido.  Patient provided with reminder card and in agreement with date and time.  Patient denies needing any other SW assistance at this time, ISAR negative.  Patient requesting walker to assist with ambulation.  MD made aware and walker provided to patient.  Patient confirms having transportation home.  No other SW needs identified at this time.

## 2023-08-30 NOTE — ED PROVIDER NOTE - ATTENDING APP SHARED VISIT CONTRIBUTION OF CARE
Eval with JAYLYN Quintanilla. Patient s/p twisting while wearing platform shoes. Xray with nondispl fx of 5th metatarsal. Posterior splint placed and walker given. Arranged f/u with ortho for today. I performed a face to face bedside interview with patient regarding history of present illness, review of symptoms and past medical history. I completed an independent physical exam.  I have discussed the patient's plan of care with Physician Assistant (PA). I agree with note as stated above, having amended the EMR as needed to reflect my findings.   This includes History of Present Illness, HIV, Past Medical/Surgical/Family/Social History, Allergies and Home Medications, Review of Systems, Physical Exam, and any Progress Notes during the time I functioned as the attending physician for this patient.

## 2023-08-30 NOTE — ED PROVIDER NOTE - OBJECTIVE STATEMENT
pt 77 yo f c/o left foot pain after she twisted her foot walking down a step. didn't take any pain meds and doesn't need any. denies numbness, tingling, weakness. no head strike, no other injuries

## 2023-08-31 NOTE — CHART NOTE - NSCHARTNOTEFT_GEN_A_CORE
Patient presented to ED on 8/29/23 due to ankle pain.  As per chart review, referral coordinator made aware and appointment secured for 8/30/23 at 2:30 PM with Dr Garrido.

## 2023-09-11 ENCOUNTER — EMERGENCY (EMERGENCY)
Facility: HOSPITAL | Age: 78
LOS: 1 days | Discharge: HOME | End: 2023-09-11
Attending: INTERNAL MEDICINE | Admitting: INTERNAL MEDICINE
Payer: MEDICARE

## 2023-09-11 VITALS
OXYGEN SATURATION: 97 % | DIASTOLIC BLOOD PRESSURE: 61 MMHG | HEART RATE: 79 BPM | TEMPERATURE: 98 F | HEIGHT: 64 IN | WEIGHT: 125 LBS | SYSTOLIC BLOOD PRESSURE: 110 MMHG | RESPIRATION RATE: 18 BRPM

## 2023-09-11 DIAGNOSIS — Z90.89 ACQUIRED ABSENCE OF OTHER ORGANS: Chronic | ICD-10-CM

## 2023-09-11 DIAGNOSIS — Z98.891 HISTORY OF UTERINE SCAR FROM PREVIOUS SURGERY: Chronic | ICD-10-CM

## 2023-09-11 DIAGNOSIS — Z98.890 OTHER SPECIFIED POSTPROCEDURAL STATES: Chronic | ICD-10-CM

## 2023-09-11 PROCEDURE — 99284 EMERGENCY DEPT VISIT MOD MDM: CPT

## 2023-09-11 PROCEDURE — 73620 X-RAY EXAM OF FOOT: CPT | Mod: 26,LT

## 2023-09-11 PROCEDURE — 73620 X-RAY EXAM OF FOOT: CPT

## 2023-09-11 PROCEDURE — 99283 EMERGENCY DEPT VISIT LOW MDM: CPT

## 2023-09-11 RX ORDER — DENOSUMAB 60 MG/ML
60 INJECTION SUBCUTANEOUS
Refills: 0 | DISCHARGE

## 2023-09-11 RX ORDER — FLUOXETINE HCL 10 MG
1 CAPSULE ORAL
Qty: 0 | Refills: 0 | DISCHARGE

## 2023-09-11 NOTE — ED PROVIDER NOTE - OBJECTIVE STATEMENT
77 y/o female w pmhx of HLD, GERD, and OA presents c/o fall X 2 days. She was here 2 weeks ago for a fall down 2 stairs and scans revealed fx of Left foot. She states she was feeling fine but 2 days ago she fell down two stairs while she was not wearing her boot and landed on her L foot. She has been feeling mild pain since then rates a 2/10. She has no cuts or bruises in the area but came into ED just to get foot re checked. Denies fever, headache, dizziness, chills, injuries to other areas of body, swelling, pain in calf or leg.

## 2023-09-11 NOTE — ED PROVIDER NOTE - NSFOLLOWUPINSTRUCTIONS_ED_ALL_ED_FT
Follow up with your PMD within 1-2 days.  Rest, increase your fluids, advance your activity as tolerated.   Take all of your other medications as previously prescribed.   Worsening, continued or ANY new concerning symptoms return to the emergency department.  Patient recommended to continue using cam boot walker also recommended calcium and vitamin D supplementation and Tylenol for the pain and follow-up with a private podiatrist

## 2023-09-11 NOTE — ED ADULT TRIAGE NOTE - CHIEF COMPLAINT QUOTE
Pt c/o left foot pain after falling down the stair x 3 days ago. Pt states she fx the left foot already x 2 weeks ago

## 2023-09-11 NOTE — ED PROVIDER NOTE - CLINICAL SUMMARY MEDICAL DECISION MAKING FREE TEXT BOX
79 y/o female w pmhx of HLD, GERD, and OA presents c/o fall X 2 days. She was here 2 weeks ago for a fall down 2 stairs and scans revealed fx of Left foot. She states she was feeling fine but 2 days ago she fell down two stairs while she was not wearing her boot and landed on her L foot. She has been feeling mild pain since then rates a 2/10. She has no cuts or bruises in the area but came into ED just to get foot re checked. Denies fever, headache, dizziness, chills, injuries to other areas of body, swelling, pain in calf or leg.  Patient is on a cam boot walker left x-ray shows a left base fifth metatarsal fracture likely the one that she had no displacement

## 2023-09-11 NOTE — ED PROVIDER NOTE - PATIENT PORTAL LINK FT
You can access the FollowMyHealth Patient Portal offered by [facility] by registering at the following website: [email or web address]. By joining [employer]’s FollowMyHealth portal, you will also be able to view your health information using other applications (apps) compatible with our system.

## 2023-09-11 NOTE — ED ADULT NURSE NOTE - NSFALLRISKINTERV_ED_ALL_ED

## 2023-09-11 NOTE — ED PROVIDER NOTE - NS ED ATTENDING STATEMENT MOD
Attending Only I have seen and examined this patient and fully participated in the care of this patient as the teaching attending.  The service was shared with the IKE.  I reviewed and verified the documentation and independently performed the documented:

## 2023-09-11 NOTE — ED PROVIDER NOTE - MUSCULOSKELETAL, MLM
Mild pain on dorsal side of L foot along 5th metatarsal, PIP, DIP. Spine appears normal, range of motion is not limited.

## 2023-09-11 NOTE — ED PROVIDER NOTE - ATTENDING CONTRIBUTION TO CARE
77 y/o female w pmhx of HLD, GERD, and OA presents c/o fall X 2 days. She was here 2 weeks ago for a fall down 2 stairs and scans revealed fx of Left foot. She states she was feeling fine but 2 days ago she fell down two stairs while she was not wearing her boot and landed on her L foot. She has been feeling mild pain since then rates a 2/10. She has no cuts or bruises in the area but came into ED just to get foot re checked. Denies fever, headache, dizziness, chills, injuries to other areas of body, swelling, pain in calf or leg.  Patient is on a cam boot walker left x-ray shows a left base fifth metatarsal fracture likely the one that she had no displacement  Patient recommended to continue using the cam boot walker and follow-up with podiatrist  Dr. Oscar:  I have reviewed and discussed with the PA/ resident the case specifics, including the history, physical assessment, evaluation, conclusion, laboratory results, and medical plan. I agree with the contents, and conclusions. I have personally examined, and interviewed the patient.

## 2023-09-12 ENCOUNTER — RX RENEWAL (OUTPATIENT)
Age: 78
End: 2023-09-12

## 2023-09-14 NOTE — CHART NOTE - NSCHARTNOTEFT_GEN_A_CORE
Patient presented to ED on 9/11/23 due to foot pain.   As per [facility], patient has scheduled PMD appointment with Dr Reyes on 9/15/23 and orthopedist follow up scheduled for 9/26/23.

## 2023-09-15 ENCOUNTER — APPOINTMENT (OUTPATIENT)
Dept: INTERNAL MEDICINE | Facility: CLINIC | Age: 78
End: 2023-09-15
Payer: MEDICARE

## 2023-09-15 VITALS
HEIGHT: 64 IN | SYSTOLIC BLOOD PRESSURE: 120 MMHG | RESPIRATION RATE: 16 BRPM | WEIGHT: 128 LBS | BODY MASS INDEX: 21.85 KG/M2 | DIASTOLIC BLOOD PRESSURE: 70 MMHG | HEART RATE: 72 BPM | TEMPERATURE: 97.8 F | OXYGEN SATURATION: 98 %

## 2023-09-15 DIAGNOSIS — E78.5 HYPERLIPIDEMIA, UNSPECIFIED: ICD-10-CM

## 2023-09-15 DIAGNOSIS — F51.01 PRIMARY INSOMNIA: ICD-10-CM

## 2023-09-15 PROCEDURE — 99214 OFFICE O/P EST MOD 30 MIN: CPT

## 2023-09-15 RX ORDER — ZOLPIDEM TARTRATE 5 MG/1
5 TABLET ORAL
Qty: 30 | Refills: 5 | Status: ACTIVE | COMMUNITY
Start: 2022-08-09 | End: 1900-01-01

## 2023-09-15 RX ORDER — DENOSUMAB 60 MG/ML
60 INJECTION SUBCUTANEOUS
Qty: 1 | Refills: 1 | Status: ACTIVE | COMMUNITY
Start: 2023-09-15 | End: 1900-01-01

## 2023-09-15 RX ORDER — ATORVASTATIN CALCIUM 10 MG/1
10 TABLET, FILM COATED ORAL
Qty: 1 | Refills: 3 | Status: ACTIVE | COMMUNITY
Start: 2023-02-02 | End: 1900-01-01

## 2023-09-15 RX ORDER — CLOTRIMAZOLE 10 MG/G
1 CREAM TOPICAL
Qty: 1 | Refills: 3 | Status: ACTIVE | COMMUNITY
Start: 2022-08-09 | End: 1900-01-01

## 2023-09-26 ENCOUNTER — APPOINTMENT (OUTPATIENT)
Dept: ORTHOPEDIC SURGERY | Facility: CLINIC | Age: 78
End: 2023-09-26
Payer: MEDICARE

## 2023-09-26 VITALS
HEART RATE: 66 BPM | BODY MASS INDEX: 21.34 KG/M2 | HEIGHT: 64 IN | SYSTOLIC BLOOD PRESSURE: 104 MMHG | WEIGHT: 125 LBS | DIASTOLIC BLOOD PRESSURE: 72 MMHG

## 2023-09-26 PROCEDURE — 73630 X-RAY EXAM OF FOOT: CPT | Mod: LT

## 2023-09-26 PROCEDURE — 99024 POSTOP FOLLOW-UP VISIT: CPT

## 2023-10-06 ENCOUNTER — APPOINTMENT (OUTPATIENT)
Dept: INTERNAL MEDICINE | Facility: CLINIC | Age: 78
End: 2023-10-06

## 2023-10-06 DIAGNOSIS — M80.80XG OTHER OSTEOPOROSIS WITH CURRENT PATHOLOGICAL FRACTURE, UNSPECIFIED SITE, SUBSEQUENT ENCOUNTER FOR FRACTURE WITH DELAYED HEALING: ICD-10-CM

## 2023-10-06 RX ORDER — DENOSUMAB 60 MG/ML
60 INJECTION SUBCUTANEOUS
Qty: 1 | Refills: 0 | Status: COMPLETED | OUTPATIENT
Start: 2023-10-06

## 2023-10-06 RX ADMIN — DENOSUMAB 1 MG/ML: 60 INJECTION SUBCUTANEOUS at 00:00

## 2023-10-11 ENCOUNTER — APPOINTMENT (OUTPATIENT)
Dept: ULTRASOUND IMAGING | Facility: HOSPITAL | Age: 78
End: 2023-10-11
Payer: MEDICARE

## 2023-10-11 ENCOUNTER — RESULT REVIEW (OUTPATIENT)
Age: 78
End: 2023-10-11

## 2023-10-11 ENCOUNTER — OUTPATIENT (OUTPATIENT)
Dept: OUTPATIENT SERVICES | Facility: HOSPITAL | Age: 78
LOS: 1 days | End: 2023-10-11
Payer: MEDICARE

## 2023-10-11 DIAGNOSIS — Z90.89 ACQUIRED ABSENCE OF OTHER ORGANS: Chronic | ICD-10-CM

## 2023-10-11 DIAGNOSIS — I65.23 OCCLUSION AND STENOSIS OF BILATERAL CAROTID ARTERIES: ICD-10-CM

## 2023-10-11 DIAGNOSIS — Z98.891 HISTORY OF UTERINE SCAR FROM PREVIOUS SURGERY: Chronic | ICD-10-CM

## 2023-10-11 DIAGNOSIS — Z98.890 OTHER SPECIFIED POSTPROCEDURAL STATES: Chronic | ICD-10-CM

## 2023-10-11 PROCEDURE — 93880 EXTRACRANIAL BILAT STUDY: CPT

## 2023-10-11 PROCEDURE — 93880 EXTRACRANIAL BILAT STUDY: CPT | Mod: 26

## 2023-11-03 RX ORDER — ZOSTER VACCINE RECOMBINANT, ADJUVANTED 50 MCG/0.5
50 KIT INTRAMUSCULAR
Qty: 1 | Refills: 0 | Status: DISCONTINUED | COMMUNITY
Start: 2018-03-05 | End: 2023-11-03

## 2023-11-07 ENCOUNTER — APPOINTMENT (OUTPATIENT)
Dept: ORTHOPEDIC SURGERY | Facility: CLINIC | Age: 78
End: 2023-11-07
Payer: MEDICARE

## 2023-11-07 VITALS — DIASTOLIC BLOOD PRESSURE: 76 MMHG | SYSTOLIC BLOOD PRESSURE: 107 MMHG | HEART RATE: 72 BPM

## 2023-11-07 PROCEDURE — 73660 X-RAY EXAM OF TOE(S): CPT | Mod: RT

## 2023-11-07 PROCEDURE — 99024 POSTOP FOLLOW-UP VISIT: CPT

## 2023-11-07 PROCEDURE — 73630 X-RAY EXAM OF FOOT: CPT | Mod: LT

## 2023-11-13 ENCOUNTER — APPOINTMENT (OUTPATIENT)
Dept: DERMATOLOGY | Facility: CLINIC | Age: 78
End: 2023-11-13
Payer: MEDICARE

## 2023-11-13 DIAGNOSIS — L82.0 INFLAMED SEBORRHEIC KERATOSIS: ICD-10-CM

## 2023-11-13 DIAGNOSIS — L81.4 OTHER MELANIN HYPERPIGMENTATION: ICD-10-CM

## 2023-11-13 DIAGNOSIS — L57.0 ACTINIC KERATOSIS: ICD-10-CM

## 2023-11-13 DIAGNOSIS — D22.9 MELANOCYTIC NEVI, UNSPECIFIED: ICD-10-CM

## 2023-11-13 PROCEDURE — 17110 DESTRUCTION B9 LES UP TO 14: CPT | Mod: 59

## 2023-11-13 PROCEDURE — 17000 DESTRUCT PREMALG LESION: CPT | Mod: 59

## 2023-11-13 PROCEDURE — 17003 DESTRUCT PREMALG LES 2-14: CPT | Mod: 59

## 2023-11-13 PROCEDURE — 99203 OFFICE O/P NEW LOW 30 MIN: CPT | Mod: 25

## 2024-01-09 ENCOUNTER — APPOINTMENT (OUTPATIENT)
Dept: ORTHOPEDIC SURGERY | Facility: CLINIC | Age: 79
End: 2024-01-09

## 2024-04-16 NOTE — ED ADULT NURSE NOTE - NS_SISCREENINGSR_GEN_ALL_ED
Transitional Care Management Telephone Call Attempt    Discharge Date: 4/12/24  Discharge Location: [facility]    Call Attempt Date: 4/16/2024  Call Attempt: Second  
Negative

## 2024-08-06 NOTE — ED ADULT TRIAGE NOTE - BMI (KG/M2)
You can access the FollowMyHealth Patient Portal offered by [facility] by registering at the following website: [email or web address]. By joining [employer]’s FollowMyHealth portal, you will also be able to view your health information using other applications (apps) compatible with our system.
20.6

## 2024-08-09 NOTE — H&P ADULT - NSICDXFAMHXNEG_GEN_ALL
Pt showed 15 late for hearing test and h.a adjustment, however she did not get script from  for testing.  Attempted to call  office for script,  is out of the office until TH.  Office staff entered note requesting script.  Gave them fax # - Also noted they can put in Epic.  Will call pt when script is received for appt.  Electronically signed by Leana Vanessa on 8/9/2024 at 2:27 PM    
bleeding or anesthesia problems

## 2024-08-31 ENCOUNTER — APPOINTMENT (OUTPATIENT)
Dept: DERMATOLOGY | Facility: CLINIC | Age: 79
End: 2024-08-31
Payer: MEDICARE

## 2024-08-31 DIAGNOSIS — D48.7 NEOPLASM OF UNCERTAIN BEHAVIOR OF OTHER SPECIFIED SITES: ICD-10-CM

## 2024-08-31 PROCEDURE — 99212 OFFICE O/P EST SF 10 MIN: CPT

## 2024-08-31 NOTE — HISTORY OF PRESENT ILLNESS
[FreeTextEntry1] : c/o tender bump on right palm. for months. 4th finger "got stuck" once [de-identified] : No previous history or treatment.

## 2024-08-31 NOTE — ASSESSMENT
[FreeTextEntry1] : Alert, oriented, well, pleasant.  1.4 cm soft moveable dome-shaped nodule palm. Moves with manipulation of 4th finger. No other lesions on right or left palm. Neoplasm uncertain behavior. 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  Recommend see hand surgeon. Dr Mckenna.

## 2024-09-03 ENCOUNTER — NON-APPOINTMENT (OUTPATIENT)
Age: 79
End: 2024-09-03

## 2024-09-03 DIAGNOSIS — L82.1 OTHER SEBORRHEIC KERATOSIS: ICD-10-CM

## 2024-09-03 DIAGNOSIS — Z92.29 PERSONAL HISTORY OF OTHER DRUG THERAPY: ICD-10-CM

## 2024-09-20 DIAGNOSIS — Z13.21 ENCOUNTER FOR SCREENING FOR NUTRITIONAL DISORDER: ICD-10-CM

## 2024-09-20 DIAGNOSIS — Z13.1 ENCOUNTER FOR SCREENING FOR DIABETES MELLITUS: ICD-10-CM

## 2024-09-20 DIAGNOSIS — Z13.29 ENCOUNTER FOR SCREENING FOR OTHER SUSPECTED ENDOCRINE DISORDER: ICD-10-CM

## 2024-09-20 LAB
25(OH)D3 SERPL-MCNC: 40.6 NG/ML
ALBUMIN SERPL ELPH-MCNC: 4.4 G/DL
ALP BLD-CCNC: 42 U/L
ALT SERPL-CCNC: 12 U/L
ANION GAP SERPL CALC-SCNC: 14 MMOL/L
APPEARANCE: CLEAR
AST SERPL-CCNC: 23 U/L
BASOPHILS # BLD AUTO: 0.03 K/UL
BASOPHILS NFR BLD AUTO: 0.7 %
BILIRUB SERPL-MCNC: 0.6 MG/DL
BILIRUBIN URINE: NEGATIVE
BLOOD URINE: NEGATIVE
BUN SERPL-MCNC: 25 MG/DL
CALCIUM SERPL-MCNC: 9.7 MG/DL
CHLORIDE SERPL-SCNC: 105 MMOL/L
CHOLEST SERPL-MCNC: 197 MG/DL
CO2 SERPL-SCNC: 23 MMOL/L
COLOR: YELLOW
CREAT SERPL-MCNC: 0.86 MG/DL
EGFR: 68 ML/MIN/1.73M2
EOSINOPHIL # BLD AUTO: 0.08 K/UL
EOSINOPHIL NFR BLD AUTO: 1.9 %
ESTIMATED AVERAGE GLUCOSE: 108 MG/DL
GLUCOSE QUALITATIVE U: NEGATIVE MG/DL
GLUCOSE SERPL-MCNC: 89 MG/DL
HBA1C MFR BLD HPLC: 5.4 %
HCT VFR BLD CALC: 37.9 %
HDLC SERPL-MCNC: 84 MG/DL
HGB BLD-MCNC: 12.6 G/DL
IMM GRANULOCYTES NFR BLD AUTO: 0.2 %
KETONES URINE: NEGATIVE MG/DL
LDLC SERPL CALC-MCNC: 100 MG/DL
LEUKOCYTE ESTERASE URINE: NEGATIVE
LYMPHOCYTES # BLD AUTO: 1.22 K/UL
LYMPHOCYTES NFR BLD AUTO: 29.4 %
MAN DIFF?: NORMAL
MCHC RBC-ENTMCNC: 33.2 GM/DL
MCHC RBC-ENTMCNC: 34.3 PG
MCV RBC AUTO: 103.3 FL
MONOCYTES # BLD AUTO: 0.38 K/UL
MONOCYTES NFR BLD AUTO: 9.2 %
NEUTROPHILS # BLD AUTO: 2.43 K/UL
NEUTROPHILS NFR BLD AUTO: 58.6 %
NITRITE URINE: NEGATIVE
NONHDLC SERPL-MCNC: 114 MG/DL
PH URINE: 6
PLATELET # BLD AUTO: 199 K/UL
POTASSIUM SERPL-SCNC: 5.3 MMOL/L
PROT SERPL-MCNC: 7.1 G/DL
PROTEIN URINE: NEGATIVE MG/DL
RBC # BLD: 3.67 M/UL
RBC # FLD: 14.8 %
SODIUM SERPL-SCNC: 142 MMOL/L
SPECIFIC GRAVITY URINE: 1.01
TRIGL SERPL-MCNC: 72 MG/DL
TSH SERPL-ACNC: 1.28 UIU/ML
UROBILINOGEN URINE: 0.2 MG/DL
WBC # FLD AUTO: 4.15 K/UL

## 2024-10-01 ENCOUNTER — APPOINTMENT (OUTPATIENT)
Dept: INTERNAL MEDICINE | Facility: CLINIC | Age: 79
End: 2024-10-01

## 2024-10-01 ENCOUNTER — NON-APPOINTMENT (OUTPATIENT)
Age: 79
End: 2024-10-01

## 2024-10-01 VITALS
WEIGHT: 125 LBS | OXYGEN SATURATION: 98 % | BODY MASS INDEX: 21.87 KG/M2 | RESPIRATION RATE: 16 BRPM | HEART RATE: 63 BPM | TEMPERATURE: 97.4 F | DIASTOLIC BLOOD PRESSURE: 72 MMHG | HEIGHT: 63.5 IN | SYSTOLIC BLOOD PRESSURE: 101 MMHG

## 2024-10-01 DIAGNOSIS — Z23 ENCOUNTER FOR IMMUNIZATION: ICD-10-CM

## 2024-10-01 DIAGNOSIS — Z00.00 ENCOUNTER FOR GENERAL ADULT MEDICAL EXAMINATION W/OUT ABNORMAL FINDINGS: ICD-10-CM

## 2024-10-01 PROCEDURE — G0444 DEPRESSION SCREEN ANNUAL: CPT

## 2024-10-01 PROCEDURE — 93000 ELECTROCARDIOGRAM COMPLETE: CPT | Mod: XU

## 2024-10-01 PROCEDURE — 90677 PCV20 VACCINE IM: CPT

## 2024-10-01 PROCEDURE — G0439: CPT

## 2024-10-01 PROCEDURE — G0009: CPT

## 2024-10-01 NOTE — HEALTH RISK ASSESSMENT
[Very Good] : ~his/her~  mood as very good [Never (0 pts)] : Never (0 points) [No] : In the past 12 months have you used drugs other than those required for medical reasons? No [No falls in past year] : Patient reported no falls in the past year [0] : 2) Feeling down, depressed, or hopeless: Not at all (0) [PHQ-2 Negative - No further assessment needed] : PHQ-2 Negative - No further assessment needed [Never] : Never [NO] : No [Patient reported colonoscopy was abnormal] : Patient reported colonoscopy was abnormal [HIV test declined] : HIV test declined [Hepatitis C test declined] : Hepatitis C test declined [Feels Safe at Home] : Feels safe at home [Fully functional (bathing, dressing, toileting, transferring, walking, feeding)] : Fully functional (bathing, dressing, toileting, transferring, walking, feeding) [Fully functional (using the telephone, shopping, preparing meals, housekeeping, doing laundry, using] : Fully functional and needs no help or supervision to perform IADLs (using the telephone, shopping, preparing meals, housekeeping, doing laundry, using transportation, managing medications and managing finances) [de-identified] : walking an yoga [de-identified] : pretty good [NGO9Hhxmy] : 0 [Change in mental status noted] : No change in mental status noted [Language] : denies difficulty with language [Learning/Retaining New Information] : denies difficulty learning/retaining new information [Handling Complex Tasks] : denies difficulty handling complex tasks [Reasoning] : denies difficulty with reasoning [Spatial Ability and Orientation] : denies difficulty with spatial ability and orientation [Reports changes in hearing] : Reports no changes in hearing [Reports changes in vision] : Reports no changes in vision [Reports changes in dental health] : Reports no changes in dental health [BoneDensityDate] : 2021 [ColonoscopyDate] : 2024

## 2024-10-09 NOTE — H&P ADULT - NSHPPHYSICALEXAM_GEN_ALL_CORE
[facility]   OCCUPATIONAL THERAPY MISSED TREATMENT NOTE   INPATIENT   Date: 10/9/24  Patient Name: Emeterio Feldmna       Room:   MRN: [id number]   Account #: [id number]    : MSK: + decreased ROM secondary to pain, left hip        Remainder of exam per medical clearance note MSK: No deformity or open wounds. No rashes or lesions. EHL/TA/GS/FHL 5/5 BLE. Sensation intact and equal BLE. Skin warm and well perfused. DP palpable BLE. + decreased ROM secondary to pain, left hip    Remainder of exam per medical clearance note

## 2024-12-19 NOTE — DISCHARGE NOTE ADULT - NSFTFSTATUSABRD_GEN_ALL_CORE
I called this patient and she said she just talked to someone and this is done   PATIENT NEEDS ASSISTANCE TO LEAVE RESIDENCE:

## 2025-01-10 RX ORDER — ZOLPIDEM TARTRATE 5 MG/1
5 TABLET ORAL
Qty: 30 | Refills: 0 | Status: ACTIVE | COMMUNITY
Start: 2025-01-10 | End: 1900-01-01

## 2025-01-30 ENCOUNTER — APPOINTMENT (OUTPATIENT)
Dept: DERMATOLOGY | Facility: CLINIC | Age: 80
End: 2025-01-30
Payer: MEDICARE

## 2025-01-30 DIAGNOSIS — I78.1 NEVUS, NON-NEOPLASTIC: ICD-10-CM

## 2025-01-30 DIAGNOSIS — L82.1 OTHER SEBORRHEIC KERATOSIS: ICD-10-CM

## 2025-01-30 DIAGNOSIS — L57.0 ACTINIC KERATOSIS: ICD-10-CM

## 2025-01-30 DIAGNOSIS — L57.8 OTHER SKIN CHANGES DUE TO CHRONIC EXPOSURE TO NONIONIZING RADIATION: ICD-10-CM

## 2025-01-30 PROCEDURE — 17000 DESTRUCT PREMALG LESION: CPT

## 2025-01-30 PROCEDURE — 99212 OFFICE O/P EST SF 10 MIN: CPT | Mod: 25

## 2025-04-16 ENCOUNTER — NON-APPOINTMENT (OUTPATIENT)
Age: 80
End: 2025-04-16

## 2025-04-21 ENCOUNTER — APPOINTMENT (OUTPATIENT)
Dept: ORTHOPEDIC SURGERY | Facility: CLINIC | Age: 80
End: 2025-04-21
Payer: MEDICARE

## 2025-04-21 VITALS — WEIGHT: 130 LBS | BODY MASS INDEX: 23.04 KG/M2 | HEIGHT: 63 IN

## 2025-04-21 DIAGNOSIS — M17.12 UNILATERAL PRIMARY OSTEOARTHRITIS, LEFT KNEE: ICD-10-CM

## 2025-04-21 DIAGNOSIS — M79.18 MYALGIA, OTHER SITE: ICD-10-CM

## 2025-04-21 PROCEDURE — 99204 OFFICE O/P NEW MOD 45 MIN: CPT | Mod: 25

## 2025-04-21 PROCEDURE — 20610 DRAIN/INJ JOINT/BURSA W/O US: CPT | Mod: LT

## 2025-04-21 PROCEDURE — J3490M: CUSTOM | Mod: NC,JZ

## 2025-04-21 PROCEDURE — 73564 X-RAY EXAM KNEE 4 OR MORE: CPT | Mod: LT

## (undated) DEVICE — SYM-STRYKER SYSTEM 7: Type: DURABLE MEDICAL EQUIPMENT

## (undated) DEVICE — DRAPE IOBAN 23X23"

## (undated) DEVICE — SOL IRR POUR NS 0.9% 1000ML

## (undated) DEVICE — SOL IRR POUR H2O 1000ML

## (undated) DEVICE — DRILL BIT SYNTHES ORTHO CANN QC 5X300MM

## (undated) DEVICE — DRAPE C ARM 41X140"

## (undated) DEVICE — DRSG SILVERLON ISLAND 4X6" 2X4" PAD

## (undated) DEVICE — STAPLER SKIN VISI-STAT 35 WIDE

## (undated) DEVICE — DRSG COBAN 6"

## (undated) DEVICE — DRAPE TOP SHEET 53"X101"

## (undated) DEVICE — PACK HIP FRACTURE

## (undated) DEVICE — GLV 8 PROTEXIS

## (undated) DEVICE — WRAP COMPRESSION CALF MED

## (undated) DEVICE — DRSG WEBRIL 6"

## (undated) DEVICE — DRAPE MAYO STAND 30"